# Patient Record
Sex: FEMALE | Race: WHITE | NOT HISPANIC OR LATINO | Employment: PART TIME | URBAN - METROPOLITAN AREA
[De-identification: names, ages, dates, MRNs, and addresses within clinical notes are randomized per-mention and may not be internally consistent; named-entity substitution may affect disease eponyms.]

---

## 2017-01-16 ENCOUNTER — ALLSCRIPTS OFFICE VISIT (OUTPATIENT)
Dept: OTHER | Facility: OTHER | Age: 22
End: 2017-01-16

## 2017-01-16 DIAGNOSIS — Z79.899 OTHER LONG TERM (CURRENT) DRUG THERAPY: ICD-10-CM

## 2017-01-16 DIAGNOSIS — F32.9 MAJOR DEPRESSIVE DISORDER, SINGLE EPISODE: ICD-10-CM

## 2017-01-22 ENCOUNTER — LAB CONVERSION - ENCOUNTER (OUTPATIENT)
Dept: OTHER | Facility: OTHER | Age: 22
End: 2017-01-22

## 2017-01-22 LAB
25(OH)D3 SERPL-MCNC: 32 NG/ML (ref 30–100)
A/G RATIO (HISTORICAL): 1.6 (CALC) (ref 1–2.5)
ALBUMIN SERPL BCP-MCNC: 4.2 G/DL (ref 3.6–5.1)
ALP SERPL-CCNC: 73 U/L (ref 47–176)
ALT SERPL W P-5'-P-CCNC: 13 U/L (ref 5–32)
AST SERPL W P-5'-P-CCNC: 13 U/L (ref 12–32)
BASOPHILS # BLD AUTO: 0.3 %
BASOPHILS # BLD AUTO: 21 CELLS/UL (ref 0–200)
BILIRUB SERPL-MCNC: 0.4 MG/DL (ref 0.2–1.1)
BUN SERPL-MCNC: 14 MG/DL (ref 7–20)
BUN/CREA RATIO (HISTORICAL): NORMAL (CALC) (ref 6–22)
CALCIUM SERPL-MCNC: 9.3 MG/DL (ref 8.9–10.4)
CHLORIDE SERPL-SCNC: 101 MMOL/L (ref 98–110)
CO2 SERPL-SCNC: 30 MMOL/L (ref 20–31)
CREAT SERPL-MCNC: 0.73 MG/DL (ref 0.5–1)
DEPRECATED RDW RBC AUTO: 13.2 % (ref 11–15)
EGFR AFRICAN AMERICAN (HISTORICAL): 138 ML/MIN/1.73M2
EGFR-AMERICAN CALC (HISTORICAL): 119 ML/MIN/1.73M2
EOSINOPHIL # BLD AUTO: 1.4 %
EOSINOPHIL # BLD AUTO: 97 CELLS/UL (ref 15–500)
GAMMA GLOBULIN (HISTORICAL): 2.6 G/DL (CALC) (ref 2–3.8)
GLUCOSE (HISTORICAL): 88 MG/DL (ref 65–99)
HCT VFR BLD AUTO: 39.1 % (ref 35–45)
HGB BLD-MCNC: 12.7 G/DL (ref 11.7–15.5)
LYMPHOCYTES # BLD AUTO: 2118 CELLS/UL (ref 850–3900)
LYMPHOCYTES # BLD AUTO: 30.7 %
MCH RBC QN AUTO: 28.9 PG (ref 27–33)
MCHC RBC AUTO-ENTMCNC: 32.5 G/DL (ref 32–36)
MCV RBC AUTO: 88.7 FL (ref 80–100)
MONOCYTES # BLD AUTO: 497 CELLS/UL (ref 200–950)
MONOCYTES (HISTORICAL): 7.2 %
NEUTROPHILS # BLD AUTO: 4168 CELLS/UL (ref 1500–7800)
NEUTROPHILS # BLD AUTO: 60.4 %
PLATELET # BLD AUTO: 269 THOUSAND/UL (ref 140–400)
PMV BLD AUTO: 9.3 FL (ref 7.5–11.5)
POTASSIUM SERPL-SCNC: 4.5 MMOL/L (ref 3.8–5.1)
RBC # BLD AUTO: 4.41 MILLION/UL (ref 3.8–5.1)
SODIUM SERPL-SCNC: 140 MMOL/L (ref 135–146)
TOTAL PROTEIN (HISTORICAL): 6.8 G/DL (ref 6.3–8.2)
TSH SERPL DL<=0.05 MIU/L-ACNC: 2.09 MIU/L
WBC # BLD AUTO: 6.9 THOUSAND/UL (ref 3.8–10.8)

## 2017-03-30 ENCOUNTER — GENERIC CONVERSION - ENCOUNTER (OUTPATIENT)
Dept: OTHER | Facility: OTHER | Age: 22
End: 2017-03-30

## 2017-03-31 ENCOUNTER — ALLSCRIPTS OFFICE VISIT (OUTPATIENT)
Dept: OTHER | Facility: OTHER | Age: 22
End: 2017-03-31

## 2017-03-31 DIAGNOSIS — G47.9 SLEEP DISORDER: ICD-10-CM

## 2017-03-31 DIAGNOSIS — R53.83 OTHER FATIGUE: ICD-10-CM

## 2017-03-31 DIAGNOSIS — D64.9 ANEMIA: ICD-10-CM

## 2017-04-03 ENCOUNTER — GENERIC CONVERSION - ENCOUNTER (OUTPATIENT)
Dept: OTHER | Facility: OTHER | Age: 22
End: 2017-04-03

## 2017-04-08 ENCOUNTER — LAB CONVERSION - ENCOUNTER (OUTPATIENT)
Dept: OTHER | Facility: OTHER | Age: 22
End: 2017-04-08

## 2017-04-08 LAB
ERYTHROCYTE SEDIMENTATION RATE (HISTORICAL): 9 MM/H
HEPATITIS C ANTIBODY (HISTORICAL): NORMAL
SIGNAL TO CUT-OFF (HISTORICAL): 0.01
T4 FREE SERPL-MCNC: 2.5 NG/DL (ref 0.8–1.4)
TSH SERPL DL<=0.05 MIU/L-ACNC: 0.02 MIU/L

## 2017-04-10 ENCOUNTER — TRANSCRIBE ORDERS (OUTPATIENT)
Dept: ADMINISTRATIVE | Facility: HOSPITAL | Age: 22
End: 2017-04-10

## 2017-04-10 ENCOUNTER — LAB CONVERSION - ENCOUNTER (OUTPATIENT)
Dept: OTHER | Facility: OTHER | Age: 22
End: 2017-04-10

## 2017-04-10 DIAGNOSIS — G47.9 SLEEP DISORDER: Primary | ICD-10-CM

## 2017-04-10 LAB
CORTIS SERPL-MCNC: 10 MCG/DL
ERYTHROCYTE SEDIMENTATION RATE (HISTORICAL): 9 MM/H
FERRITIN SERPL-MCNC: 18 NG/ML (ref 10–154)
HEPATITIS C ANTIBODY (HISTORICAL): NORMAL
LYME IGG/IGM AB (HISTORICAL): <0.9 INDEX
SIGNAL TO CUT-OFF (HISTORICAL): 0.01
T4 FREE SERPL-MCNC: 2.5 NG/DL (ref 0.8–1.4)
TSH SERPL DL<=0.05 MIU/L-ACNC: 0.02 MIU/L

## 2017-04-11 ENCOUNTER — LAB CONVERSION - ENCOUNTER (OUTPATIENT)
Dept: OTHER | Facility: OTHER | Age: 22
End: 2017-04-11

## 2017-04-11 LAB
A/G RATIO (HISTORICAL): 1.5 (CALC) (ref 1–2.5)
ALBUMIN SERPL BCP-MCNC: 4.1 G/DL (ref 3.6–5.1)
ALP SERPL-CCNC: 82 U/L (ref 33–115)
ALT SERPL W P-5'-P-CCNC: 29 U/L (ref 6–29)
ANTI-NUCLEAR ANTIBODY (ANA) (HISTORICAL): NEGATIVE
AST SERPL W P-5'-P-CCNC: 22 U/L (ref 10–30)
BACTERIA UR QL AUTO: ABNORMAL /HPF
BASOPHILS # BLD AUTO: 0.7 %
BASOPHILS # BLD AUTO: 46 CELLS/UL (ref 0–200)
BILIRUB SERPL-MCNC: 0.4 MG/DL (ref 0.2–1.2)
BILIRUB UR QL STRIP: NEGATIVE
BUN SERPL-MCNC: 10 MG/DL (ref 7–25)
BUN/CREA RATIO (HISTORICAL): NORMAL (CALC) (ref 6–22)
CALCIUM OXALATE (HISTORICAL): ABNORMAL /HPF
CALCIUM SERPL-MCNC: 9.3 MG/DL (ref 8.6–10.2)
CHLORIDE SERPL-SCNC: 103 MMOL/L (ref 98–110)
CO2 SERPL-SCNC: 29 MMOL/L (ref 20–31)
COLOR UR: YELLOW
COMMENT (HISTORICAL): CLEAR
CORTIS SERPL-MCNC: 10 MCG/DL
CREAT SERPL-MCNC: 0.61 MG/DL (ref 0.5–1.1)
CULTURE RESULT (HISTORICAL): ABNORMAL
DEPRECATED RDW RBC AUTO: 13.1 % (ref 11–15)
EGFR AFRICAN AMERICAN (HISTORICAL): 151 ML/MIN/1.73M2
EGFR-AMERICAN CALC (HISTORICAL): 131 ML/MIN/1.73M2
EOSINOPHIL # BLD AUTO: 0.9 %
EOSINOPHIL # BLD AUTO: 59 CELLS/UL (ref 15–500)
ERYTHROCYTE SEDIMENTATION RATE (HISTORICAL): 9 MM/H
FECAL OCCULT BLOOD DIAGNOSTIC (HISTORICAL): NEGATIVE
FERRITIN SERPL-MCNC: 18 NG/ML (ref 10–154)
GAMMA GLOBULIN (HISTORICAL): 2.7 G/DL (CALC) (ref 1.9–3.7)
GLUCOSE (HISTORICAL): 86 MG/DL (ref 65–99)
GLUCOSE (HISTORICAL): NEGATIVE
HCT VFR BLD AUTO: 40.2 % (ref 35–45)
HEPATITIS B SURFACE ANTIGEN (HISTORICAL): NORMAL
HEPATITIS C ANTIBODY (HISTORICAL): NORMAL
HGB BLD-MCNC: 13.5 G/DL (ref 11.7–15.5)
HYALINE CASTS #/AREA URNS LPF: ABNORMAL /LPF
KETONES UR STRIP-MCNC: NEGATIVE MG/DL
LEUKOCYTE ESTERASE UR QL STRIP: NEGATIVE
LYME IGG/IGM AB (HISTORICAL): <0.9 INDEX
LYMPHOCYTES # BLD AUTO: 2138 CELLS/UL (ref 850–3900)
LYMPHOCYTES # BLD AUTO: 32.4 %
MCH RBC QN AUTO: 29.3 PG (ref 27–33)
MCHC RBC AUTO-ENTMCNC: 33.5 G/DL (ref 32–36)
MCV RBC AUTO: 87.4 FL (ref 80–100)
MONOCYTES # BLD AUTO: 601 CELLS/UL (ref 200–950)
MONOCYTES (HISTORICAL): 9.1 %
NEUTROPHILS # BLD AUTO: 3755 CELLS/UL (ref 1500–7800)
NEUTROPHILS # BLD AUTO: 56.9 %
NITRITE UR QL STRIP: NEGATIVE
PH UR STRIP.AUTO: 6 [PH] (ref 5–8)
PLATELET # BLD AUTO: 251 THOUSAND/UL (ref 140–400)
PMV BLD AUTO: 9.3 FL (ref 7.5–12.5)
POTASSIUM SERPL-SCNC: 3.9 MMOL/L (ref 3.5–5.3)
PROT UR STRIP-MCNC: NEGATIVE MG/DL
RBC # BLD AUTO: 4.6 MILLION/UL (ref 3.8–5.1)
RBC (HISTORICAL): ABNORMAL /HPF
SIGNAL TO CUT-OFF (HISTORICAL): 0.01
SODIUM SERPL-SCNC: 139 MMOL/L (ref 135–146)
SP GR UR STRIP.AUTO: 1.02 (ref 1–1.03)
SQUAMOUS EPITHELIAL CELLS (HISTORICAL): ABNORMAL /HPF
T4 FREE SERPL-MCNC: 2.5 NG/DL (ref 0.8–1.4)
TOTAL PROTEIN (HISTORICAL): 6.8 G/DL (ref 6.1–8.1)
TSH SERPL DL<=0.05 MIU/L-ACNC: 0.02 MIU/L
WBC # BLD AUTO: 6.6 THOUSAND/UL (ref 3.8–10.8)
WBC # BLD AUTO: ABNORMAL /HPF

## 2017-04-17 ENCOUNTER — TRANSCRIBE ORDERS (OUTPATIENT)
Dept: ADMINISTRATIVE | Facility: HOSPITAL | Age: 22
End: 2017-04-17

## 2017-04-17 DIAGNOSIS — E05.90 HYPERTHYROIDISM: Primary | ICD-10-CM

## 2017-04-26 ENCOUNTER — ALLSCRIPTS OFFICE VISIT (OUTPATIENT)
Dept: OTHER | Facility: OTHER | Age: 22
End: 2017-04-26

## 2017-05-10 ENCOUNTER — HOSPITAL ENCOUNTER (OUTPATIENT)
Dept: NUCLEAR MEDICINE | Facility: HOSPITAL | Age: 22
Discharge: HOME | End: 2017-05-10
Payer: COMMERCIAL

## 2017-05-10 DIAGNOSIS — E05.90 HYPERTHYROIDISM: ICD-10-CM

## 2017-05-11 ENCOUNTER — TRANSCRIBE ORDERS (OUTPATIENT)
Dept: SLEEP CENTER | Facility: CLINIC | Age: 22
End: 2017-05-11

## 2017-05-11 ENCOUNTER — HOSPITAL ENCOUNTER (OUTPATIENT)
Dept: NUCLEAR MEDICINE | Facility: HOSPITAL | Age: 22
Discharge: HOME | End: 2017-05-11
Payer: COMMERCIAL

## 2017-05-11 ENCOUNTER — HOSPITAL ENCOUNTER (OUTPATIENT)
Dept: SLEEP CENTER | Facility: CLINIC | Age: 22
Discharge: HOME | End: 2017-05-11
Payer: COMMERCIAL

## 2017-05-11 DIAGNOSIS — G47.9 SLEEP DISORDER: ICD-10-CM

## 2017-05-11 DIAGNOSIS — G47.33 OSA (OBSTRUCTIVE SLEEP APNEA): Primary | ICD-10-CM

## 2017-05-11 PROCEDURE — A9516 IODINE I-123 SOD IODIDE MIC: HCPCS

## 2017-05-11 PROCEDURE — 78014 THYROID IMAGING W/BLOOD FLOW: CPT

## 2017-05-25 ENCOUNTER — ALLSCRIPTS OFFICE VISIT (OUTPATIENT)
Dept: OTHER | Facility: OTHER | Age: 22
End: 2017-05-25

## 2017-05-25 DIAGNOSIS — E05.90 THYROTOXICOSIS WITHOUT THYROID STORM: ICD-10-CM

## 2017-06-20 ENCOUNTER — TRANSCRIBE ORDERS (OUTPATIENT)
Dept: SLEEP CENTER | Facility: CLINIC | Age: 22
End: 2017-06-20

## 2017-06-20 ENCOUNTER — HOSPITAL ENCOUNTER (OUTPATIENT)
Dept: SLEEP CENTER | Facility: CLINIC | Age: 22
Discharge: HOME | End: 2017-06-20
Payer: COMMERCIAL

## 2017-06-20 DIAGNOSIS — G47.33 OSA (OBSTRUCTIVE SLEEP APNEA): ICD-10-CM

## 2017-06-20 DIAGNOSIS — G47.411 PRIMARY NARCOLEPSY WITH CATAPLEXY: Primary | ICD-10-CM

## 2017-07-11 ENCOUNTER — HOSPITAL ENCOUNTER (EMERGENCY)
Facility: HOSPITAL | Age: 22
Discharge: HOME | End: 2017-07-11
Attending: EMERGENCY MEDICINE | Admitting: EMERGENCY MEDICINE
Payer: COMMERCIAL

## 2017-07-11 VITALS
SYSTOLIC BLOOD PRESSURE: 126 MMHG | DIASTOLIC BLOOD PRESSURE: 104 MMHG | TEMPERATURE: 98.7 F | OXYGEN SATURATION: 100 % | RESPIRATION RATE: 18 BRPM | HEART RATE: 85 BPM

## 2017-07-11 DIAGNOSIS — L29.9 ITCHING: ICD-10-CM

## 2017-07-11 DIAGNOSIS — T63.441A BEE STING REACTION: Primary | ICD-10-CM

## 2017-07-11 PROCEDURE — 99282 EMERGENCY DEPT VISIT SF MDM: CPT

## 2017-07-11 RX ORDER — LORATADINE 10 MG/1
10 TABLET ORAL ONCE
Status: COMPLETED | OUTPATIENT
Start: 2017-07-11 | End: 2017-07-11

## 2017-07-11 RX ORDER — LORATADINE 10 MG/1
10 TABLET ORAL DAILY
Qty: 7 TABLET | Refills: 0 | Status: SHIPPED | OUTPATIENT
Start: 2017-07-11 | End: 2017-08-09

## 2017-07-11 RX ORDER — FAMOTIDINE 20 MG/1
20 TABLET, FILM COATED ORAL ONCE
Status: COMPLETED | OUTPATIENT
Start: 2017-07-11 | End: 2017-07-11

## 2017-07-11 RX ADMIN — LORATADINE 10 MG: 10 TABLET ORAL at 20:16

## 2017-07-11 RX ADMIN — DEXAMETHASONE SODIUM PHOSPHATE 10 MG: 10 INJECTION, SOLUTION INTRAMUSCULAR; INTRAVENOUS at 20:16

## 2017-07-11 RX ADMIN — FAMOTIDINE 20 MG: 20 TABLET ORAL at 20:16

## 2017-07-28 ENCOUNTER — TRANSCRIBE ORDERS (OUTPATIENT)
Dept: URGENT CARE | Age: 22
End: 2017-07-28

## 2017-07-28 ENCOUNTER — OFFICE VISIT (OUTPATIENT)
Dept: URGENT CARE | Age: 22
End: 2017-07-28
Payer: COMMERCIAL

## 2017-07-28 ENCOUNTER — APPOINTMENT (OUTPATIENT)
Dept: LAB | Age: 22
End: 2017-07-28
Attending: PHYSICIAN ASSISTANT
Payer: COMMERCIAL

## 2017-07-28 DIAGNOSIS — R30.0 DYSURIA: ICD-10-CM

## 2017-07-28 DIAGNOSIS — R53.83 OTHER FATIGUE: ICD-10-CM

## 2017-07-28 DIAGNOSIS — R35.0 FREQUENCY OF MICTURITION: ICD-10-CM

## 2017-07-28 PROCEDURE — 87147 CULTURE TYPE IMMUNOLOGIC: CPT

## 2017-07-28 PROCEDURE — 81002 URINALYSIS NONAUTO W/O SCOPE: CPT | Performed by: FAMILY MEDICINE

## 2017-07-28 PROCEDURE — 87086 URINE CULTURE/COLONY COUNT: CPT

## 2017-07-28 PROCEDURE — G0382 LEV 3 HOSP TYPE B ED VISIT: HCPCS | Performed by: FAMILY MEDICINE

## 2017-07-30 LAB
BACTERIA UR CULT: NORMAL
BACTERIA UR CULT: NORMAL

## 2017-08-01 ENCOUNTER — GENERIC CONVERSION - ENCOUNTER (OUTPATIENT)
Dept: OTHER | Facility: OTHER | Age: 22
End: 2017-08-01

## 2017-08-09 ENCOUNTER — HOSPITAL ENCOUNTER (EMERGENCY)
Facility: HOSPITAL | Age: 22
Discharge: HOME | End: 2017-08-09
Attending: EMERGENCY MEDICINE
Payer: COMMERCIAL

## 2017-08-09 ENCOUNTER — APPOINTMENT (EMERGENCY)
Dept: CT IMAGING | Facility: HOSPITAL | Age: 22
End: 2017-08-09
Payer: COMMERCIAL

## 2017-08-09 VITALS
RESPIRATION RATE: 18 BRPM | OXYGEN SATURATION: 100 % | DIASTOLIC BLOOD PRESSURE: 72 MMHG | HEART RATE: 75 BPM | SYSTOLIC BLOOD PRESSURE: 111 MMHG | WEIGHT: 160 LBS | TEMPERATURE: 99 F

## 2017-08-09 DIAGNOSIS — R10.2 PELVIC PAIN: Primary | ICD-10-CM

## 2017-08-09 DIAGNOSIS — N83.209 OVARIAN CYST: ICD-10-CM

## 2017-08-09 LAB
ALBUMIN SERPL BCP-MCNC: 3.8 G/DL (ref 3.5–5)
ALP SERPL-CCNC: 77 U/L (ref 46–116)
ALT SERPL W P-5'-P-CCNC: 33 U/L (ref 12–78)
ANION GAP SERPL CALCULATED.3IONS-SCNC: 8 MMOL/L (ref 4–13)
AST SERPL W P-5'-P-CCNC: 31 U/L (ref 5–45)
BASOPHILS # BLD AUTO: 0.03 THOUSANDS/ΜL (ref 0–0.1)
BASOPHILS NFR BLD AUTO: 0 % (ref 0–1)
BILIRUB SERPL-MCNC: 0.3 MG/DL (ref 0.2–1)
BILIRUB UR QL STRIP: NEGATIVE
BUN SERPL-MCNC: 8 MG/DL (ref 5–25)
CALCIUM SERPL-MCNC: 9.1 MG/DL (ref 8.3–10.1)
CHLORIDE SERPL-SCNC: 103 MMOL/L (ref 100–108)
CLARITY UR: CLEAR
CO2 SERPL-SCNC: 29 MMOL/L (ref 21–32)
COLOR UR: NORMAL
CREAT SERPL-MCNC: 0.64 MG/DL (ref 0.6–1.3)
EOSINOPHIL # BLD AUTO: 0.05 THOUSAND/ΜL (ref 0–0.61)
EOSINOPHIL NFR BLD AUTO: 1 % (ref 0–6)
ERYTHROCYTE [DISTWIDTH] IN BLOOD BY AUTOMATED COUNT: 13.2 % (ref 11.6–15.1)
EXT BILIRUBIN, UA: NORMAL
EXT BLOOD URINE: NORMAL
EXT GLUCOSE, UA: NORMAL
EXT KETONES: NORMAL
EXT NITRITE, UA: NORMAL
EXT PH, UA: 8.5
EXT PROTEIN, UA: NORMAL
EXT SPECIFIC GRAVITY, UA: 1000
EXT UROBILINOGEN: NORMAL
GFR SERPL CREATININE-BSD FRML MDRD: 129 ML/MIN/1.73SQ M
GLUCOSE SERPL-MCNC: 91 MG/DL (ref 65–140)
GLUCOSE UR STRIP-MCNC: NEGATIVE MG/DL
HCG UR QL: NEGATIVE
HCT VFR BLD AUTO: 40.6 % (ref 34.8–46.1)
HGB BLD-MCNC: 13.3 G/DL (ref 11.5–15.4)
HGB UR QL STRIP.AUTO: NEGATIVE
KETONES UR STRIP-MCNC: NEGATIVE MG/DL
LEUKOCYTE ESTERASE UR QL STRIP: NEGATIVE
LIPASE SERPL-CCNC: 138 U/L (ref 73–393)
LYMPHOCYTES # BLD AUTO: 2.54 THOUSANDS/ΜL (ref 0.6–4.47)
LYMPHOCYTES NFR BLD AUTO: 27 % (ref 14–44)
MCH RBC QN AUTO: 29.2 PG (ref 26.8–34.3)
MCHC RBC AUTO-ENTMCNC: 32.8 G/DL (ref 31.4–37.4)
MCV RBC AUTO: 89 FL (ref 82–98)
MONOCYTES # BLD AUTO: 0.59 THOUSAND/ΜL (ref 0.17–1.22)
MONOCYTES NFR BLD AUTO: 6 % (ref 4–12)
NEUTROPHILS # BLD AUTO: 6.36 THOUSANDS/ΜL (ref 1.85–7.62)
NEUTS SEG NFR BLD AUTO: 66 % (ref 43–75)
NITRITE UR QL STRIP: NEGATIVE
PH UR STRIP.AUTO: 8 [PH] (ref 4.5–8)
PLATELET # BLD AUTO: 247 THOUSANDS/UL (ref 149–390)
PMV BLD AUTO: 10.7 FL (ref 8.9–12.7)
POTASSIUM SERPL-SCNC: 3.6 MMOL/L (ref 3.5–5.3)
PROT SERPL-MCNC: 7.4 G/DL (ref 6.4–8.2)
PROT UR STRIP-MCNC: NEGATIVE MG/DL
RBC # BLD AUTO: 4.56 MILLION/UL (ref 3.81–5.12)
SODIUM SERPL-SCNC: 140 MMOL/L (ref 136–145)
SP GR UR STRIP.AUTO: 1.01 (ref 1–1.03)
UROBILINOGEN UR QL STRIP.AUTO: 0.2 E.U./DL
WBC # BLD AUTO: 9.57 THOUSAND/UL (ref 4.31–10.16)
WBC # BLD EST: NORMAL 10*3/UL

## 2017-08-09 PROCEDURE — 99284 EMERGENCY DEPT VISIT MOD MDM: CPT

## 2017-08-09 PROCEDURE — 81003 URINALYSIS AUTO W/O SCOPE: CPT | Performed by: EMERGENCY MEDICINE

## 2017-08-09 PROCEDURE — 85025 COMPLETE CBC W/AUTO DIFF WBC: CPT | Performed by: EMERGENCY MEDICINE

## 2017-08-09 PROCEDURE — 74176 CT ABD & PELVIS W/O CONTRAST: CPT

## 2017-08-09 PROCEDURE — 81025 URINE PREGNANCY TEST: CPT | Performed by: EMERGENCY MEDICINE

## 2017-08-09 PROCEDURE — 81002 URINALYSIS NONAUTO W/O SCOPE: CPT | Performed by: EMERGENCY MEDICINE

## 2017-08-09 PROCEDURE — 83690 ASSAY OF LIPASE: CPT | Performed by: EMERGENCY MEDICINE

## 2017-08-09 PROCEDURE — 80053 COMPREHEN METABOLIC PANEL: CPT | Performed by: EMERGENCY MEDICINE

## 2017-08-09 PROCEDURE — 36415 COLL VENOUS BLD VENIPUNCTURE: CPT | Performed by: EMERGENCY MEDICINE

## 2017-08-09 RX ORDER — NAPROXEN 500 MG/1
500 TABLET ORAL 2 TIMES DAILY WITH MEALS
Qty: 30 TABLET | Refills: 0 | Status: SHIPPED | OUTPATIENT
Start: 2017-08-09 | End: 2018-05-21

## 2017-08-09 RX ORDER — ONDANSETRON 2 MG/ML
4 INJECTION INTRAMUSCULAR; INTRAVENOUS ONCE
Status: DISCONTINUED | OUTPATIENT
Start: 2017-08-09 | End: 2017-08-09 | Stop reason: HOSPADM

## 2017-08-09 RX ORDER — AMOXICILLIN 500 MG/1
500 CAPSULE ORAL EVERY 8 HOURS SCHEDULED
COMMUNITY
End: 2018-05-21

## 2017-08-09 RX ORDER — KETOROLAC TROMETHAMINE 30 MG/ML
15 INJECTION, SOLUTION INTRAMUSCULAR; INTRAVENOUS ONCE
Status: DISCONTINUED | OUTPATIENT
Start: 2017-08-09 | End: 2017-08-09 | Stop reason: HOSPADM

## 2017-08-16 ENCOUNTER — ALLSCRIPTS OFFICE VISIT (OUTPATIENT)
Dept: OTHER | Facility: OTHER | Age: 22
End: 2017-08-16

## 2017-08-17 LAB
BILIRUB UR QL STRIP: NEGATIVE
CLARITY UR: NORMAL
COLOR UR: CLEAR
GLUCOSE (HISTORICAL): NEGATIVE
HGB UR QL STRIP.AUTO: NEGATIVE
KETONES UR STRIP-MCNC: 15 MG/DL
LEUKOCYTE ESTERASE UR QL STRIP: NEGATIVE
NITRITE UR QL STRIP: NEGATIVE
PH UR STRIP.AUTO: 7.5 [PH]
PROT UR STRIP-MCNC: NEGATIVE MG/DL
SP GR UR STRIP.AUTO: 1.01
UROBILINOGEN UR QL STRIP.AUTO: 0.2

## 2017-08-18 LAB
ALT SERPL W P-5'-P-CCNC: 14 U/L (ref 6–29)
AST SERPL W P-5'-P-CCNC: 16 U/L (ref 10–30)
BASOPHILS # BLD AUTO: 0.4 %
BASOPHILS # BLD AUTO: 22 CELLS/UL (ref 0–200)
CULTURE RESULT (HISTORICAL): NORMAL
DEPRECATED RDW RBC AUTO: 12.4 % (ref 11–15)
EOSINOPHIL # BLD AUTO: 0.5 %
EOSINOPHIL # BLD AUTO: 28 CELLS/UL (ref 15–500)
HCT VFR BLD AUTO: 39.8 % (ref 35–45)
HGB BLD-MCNC: 13 G/DL (ref 11.7–15.5)
LYMPHOCYTES # BLD AUTO: 1674 CELLS/UL (ref 850–3900)
LYMPHOCYTES # BLD AUTO: 29.9 %
MAGNESIUM SERPL-MCNC: 1.9 MG/DL (ref 1.5–2.5)
MCH RBC QN AUTO: 29.1 PG (ref 27–33)
MCHC RBC AUTO-ENTMCNC: 32.7 G/DL (ref 32–36)
MCV RBC AUTO: 89 FL (ref 80–100)
MONOCYTES # BLD AUTO: 370 CELLS/UL (ref 200–950)
MONOCYTES (HISTORICAL): 6.6 %
NEUTROPHILS # BLD AUTO: 3506 CELLS/UL (ref 1500–7800)
NEUTROPHILS # BLD AUTO: 62.6 %
PLATELET # BLD AUTO: 241 THOUSAND/UL (ref 140–400)
PMV BLD AUTO: 11.2 FL (ref 7.5–12.5)
POTASSIUM SERPL-SCNC: 4 MMOL/L (ref 3.5–5.3)
RBC # BLD AUTO: 4.47 MILLION/UL (ref 3.8–5.1)
WBC # BLD AUTO: 5.6 THOUSAND/UL (ref 3.8–10.8)

## 2017-08-21 ENCOUNTER — GENERIC CONVERSION - ENCOUNTER (OUTPATIENT)
Dept: OTHER | Facility: OTHER | Age: 22
End: 2017-08-21

## 2017-08-28 ENCOUNTER — ALLSCRIPTS OFFICE VISIT (OUTPATIENT)
Dept: OTHER | Facility: OTHER | Age: 22
End: 2017-08-28

## 2017-08-28 DIAGNOSIS — R39.15 URGENCY OF URINATION: ICD-10-CM

## 2017-08-29 ENCOUNTER — LAB CONVERSION - ENCOUNTER (OUTPATIENT)
Dept: OTHER | Facility: OTHER | Age: 22
End: 2017-08-29

## 2017-08-29 LAB
CONTACT: (HISTORICAL): NORMAL
QUESTION/PROBLEM (HISTORICAL): NORMAL
TEST INFORMATION (HISTORICAL): NORMAL
TEST NAME (HISTORICAL): NORMAL

## 2017-08-31 ENCOUNTER — LAB CONVERSION - ENCOUNTER (OUTPATIENT)
Dept: OTHER | Facility: OTHER | Age: 22
End: 2017-08-31

## 2017-08-31 LAB
CONTACT: (HISTORICAL): NORMAL
CULT RSLT GENITAL (HISTORICAL): NORMAL
TEST INFORMATION (HISTORICAL): NORMAL
TEST NAME (HISTORICAL): NORMAL

## 2017-09-01 ENCOUNTER — LAB CONVERSION - ENCOUNTER (OUTPATIENT)
Dept: OTHER | Facility: OTHER | Age: 22
End: 2017-09-01

## 2017-09-01 LAB
CLINICAL COMMENT (HISTORICAL): NORMAL
CONTACT: (HISTORICAL): NORMAL
CULT RSLT GENITAL (HISTORICAL): NORMAL
HEPATITIS B SURFACE ANTIGEN (HISTORICAL): NORMAL
HEPATITIS C ANTIBODY (HISTORICAL): NORMAL
LYME IGG/IGM AB (HISTORICAL): <0.9 INDEX
RPR SCREEN (HISTORICAL): NORMAL
SIGNAL TO CUT-OFF (HISTORICAL): 0.01
TEST INFORMATION (HISTORICAL): NORMAL
TEST NAME (HISTORICAL): NORMAL
TRICHOMONAS (HISTORICAL): NOT DETECTED

## 2017-09-07 ENCOUNTER — HOSPITAL ENCOUNTER (OUTPATIENT)
Dept: ULTRASOUND IMAGING | Facility: HOSPITAL | Age: 22
Discharge: HOME | End: 2017-09-07
Payer: COMMERCIAL

## 2017-09-07 DIAGNOSIS — R39.15 URGENCY OF URINATION: ICD-10-CM

## 2017-09-07 PROCEDURE — 76856 US EXAM PELVIC COMPLETE: CPT

## 2017-09-07 PROCEDURE — 76830 TRANSVAGINAL US NON-OB: CPT

## 2017-09-12 ENCOUNTER — TRANSCRIBE ORDERS (OUTPATIENT)
Dept: SLEEP CENTER | Facility: CLINIC | Age: 22
End: 2017-09-12

## 2017-09-12 ENCOUNTER — HOSPITAL ENCOUNTER (OUTPATIENT)
Dept: SLEEP CENTER | Facility: CLINIC | Age: 22
Discharge: HOME | End: 2017-09-12
Payer: COMMERCIAL

## 2017-09-12 DIAGNOSIS — G47.411 PRIMARY NARCOLEPSY WITH CATAPLEXY: ICD-10-CM

## 2017-09-12 DIAGNOSIS — G47.33 OSA (OBSTRUCTIVE SLEEP APNEA): Primary | ICD-10-CM

## 2017-09-14 ENCOUNTER — TRANSCRIBE ORDERS (OUTPATIENT)
Dept: SLEEP CENTER | Facility: CLINIC | Age: 22
End: 2017-09-14

## 2017-09-14 DIAGNOSIS — G47.33 OSA (OBSTRUCTIVE SLEEP APNEA): Primary | ICD-10-CM

## 2017-10-30 ENCOUNTER — ALLSCRIPTS OFFICE VISIT (OUTPATIENT)
Dept: OTHER | Facility: OTHER | Age: 22
End: 2017-10-30

## 2017-10-30 DIAGNOSIS — D64.9 ANEMIA: ICD-10-CM

## 2017-10-30 DIAGNOSIS — F41.9 ANXIETY DISORDER: ICD-10-CM

## 2017-10-31 NOTE — PROGRESS NOTES
Assessment  1  Anxiety (300 00) (F41 9)    Plan  Anemia    · (1) CBC/ PLT (NO DIFF); Status:Active; Requested USH:[id number]; Anemia, Anxiety    · DULoxetine HCl - 30 MG Oral Capsule Delayed Release Particles; TAKE 1 CAPSULE Daily   · (1) COMPREHENSIVE METABOLIC PANEL; Status:Active; Requested for:30Oct2017;    · (1) TSH; Status:Active; Requested for:30Oct2017;    · Elena Stovall MD, Carmelo Dugan  (Allergy/Immunology) Co-Management  *  Status: Hold For - Scheduling   Requested for: 30VCM3522  Care Summary provided  : Yes  Anxiety    · ALPRAZolam 0 5 MG Oral Tablet (Xanax); take 1 tablet at bedtime as needed   · BusPIRone HCl - 10 MG Oral Tablet; TAKE 1 TABLET TWICE DAILY  Depression    · BusPIRone HCl - 15 MG Oral Tablet  PMH: Need for prophylactic vaccination and inoculation against influenza    · DULoxetine HCl - 60 MG Oral Capsule Delayed Release Particles (Cymbalta); TAKE 1  CAPSULE EVERY DAY BY MOUTH  Unlinked    · Naprosyn 500 MG Oral Tablet (Naproxen)    Discussion/Summary  Discussion Summary:   Advise to f/u with psychiatrist will give medicine only for 1 month  XANAX  Counseling Documentation With Imm: The patient was counseled regarding risk factor reductions,-- prognosis  Chief Complaint  Chief Complaint Free Text Note Form: pt  presents for follow up for anxiety  pt  did not have BW done  pt  would like to discuss Cymbalta  pt  would like Med refills      History of Present Illness  Anxiety Disorder, NOS (Brief): The patient is being seen for a routine clinic follow-up of anxiety  Symptoms:  anxiety,-- excessive worry-- and-- sleep disruption, but-- no difficulty concentrating,-- no fatigue,-- no nervousness-- and-- no panic attacks  The patient is currently experiencing symptoms  No associated symptoms are reported  Review of Systems  Complete-Female:   Constitutional: No fever, no chills, feels well, no tiredness, no recent weight gain or weight loss     Eyes: No complaints of eye pain, no red eyes, no eyesight problems, no discharge, no dry eyes, no itching of eyes  ENT: no complaints of earache, no loss of hearing, no nose bleeds, no nasal discharge, no sore throat, no hoarseness  Cardiovascular: No complaints of slow heart rate, no fast heart rate, no chest pain, no palpitations, no leg claudication, no lower extremity edema  Respiratory: No complaints of shortness of breath, no wheezing, no cough, no SOB on exertion, no orthopnea, no PND  Gastrointestinal: No complaints of abdominal pain, no constipation, no nausea or vomiting, no diarrhea, no bloody stools  Musculoskeletal: No complaints of arthralgias, no myalgias, no joint swelling or stiffness, no limb pain or swelling  Psychiatric: as noted in HPI  Active Problems  1  Acute hypersomnolence disorder (780 54) (G47 10)   2  Anemia (285 9) (D64 9)   3  Anxiety (300 00) (F41 9)   4  Bee sting allergy (V15 06) (Z91 038)   5  Depression (311) (F32 9)   6  Encounter for long-term (current) use of other medications (V58 69) (Z79 899)   7  Fatigue (780 79) (R53 83)   8  Hyperthyroidism (242 90) (E05 90)   9  Sleep disorder (780 50) (G47 9)    Past Medical History  1  History of Asthma (493 90) (J45 909)   2  History of Behavioral Problems (V40 9)   3  History of Birth History   4  History of Ear pain, right (388 70) (H92 01)   5  History of allergic rhinitis (V12 69) (Z87 09)   6  History of allergy (V15 09) (Z88 9)   7  History of candidal vulvovaginitis (V13 29) (Z86 19)   8  History of depression (V11 8) (Z86 59)   9  History of dysuria (V13 00) (Z87 898)   10  History of pneumonia (V12 61) (Z87 01)   11  History of tendinitis (V13 59) (Z87 39)   12  History of upper respiratory infection (V12 09) (Z87 09)   13  History of urinary frequency (V13 09) (Z87 898)   14  History of urinary urgency (V13 00) (Z87 448)   15  History of Need for Menactra vaccination (V03 89) (Z23)   16  History of Otalgia, unspecified laterality (388 70) (H96 09)   17  History of Plantar wart of right foot (078 12) (B07 0)   18  History of PPD screening test (V74 1) (Z11 1)   19  History of Reactive airway disease (493 90) (J45 909)   20  History of Screening for depression (V79 0) (Z13 89)   21  Urinary tract infection (599 0) (N39 0)  Active Problems And Past Medical History Reviewed: The active problems and past medical history were reviewed and updated today  Surgical History  1  History of Dental Surgery   2  History of Ear Pressure Equalization Tube, Insertion, Bilaterally  Surgical History Reviewed: The surgical history was reviewed and updated today  Family History  Mother    1  Family history of Colon cancer   2  Family history of Ovarian cancer  Father    3  Unknown family medical history  Family History    4  Family history of allergies (V19 6) (Z84 89)   5  Family history of diabetes mellitus (V18 0) (Z83 3)   6  Family history of malignant neoplasm of uterus (V16 49) (Z80 49)  Family History Reviewed: The family history was reviewed and updated today  Social History   · Currently sexually active   · Native language   · Never a smoker   · No drug use   · Racial background  Social History Reviewed: The social history was reviewed and updated today  Current Meds   1  ALPRAZolam 0 5 MG Oral Tablet; take 1 tablet at bedtime as needed; Last Rx:22Uww8403 Ordered   2  BusPIRone HCl - 10 MG Oral Tablet; TAKE 1 TABLET TWICE DAILY; Therapy: (Recorded:29Jab8220) to Recorded   3  BusPIRone HCl - 15 MG Oral Tablet; TAKE 1 TABLET TWICE DAILY  Requested for: 52SGA6179; Last   Rx:47Ftq7404 Ordered   4  DULoxetine HCl - 60 MG Oral Capsule Delayed Release Particles; TAKE 1 CAPSULE EVERY DAY BY   MOUTH  Requested for: 65ETG5323; Last Rx:91Uvf4912 Ordered   5  EpiPen 2-Jeramy 0 3 MG/0 3ML Injection Solution Auto-injector; INJECT 0 3ML INTRAMUSCULARLY AS   DIRECTED; Therapy: 12NWP6964 to (Last Rx:13Xad7393)  Requested for: 16Zkf2482 Ordered   6   Multivitamins TABS; TAKE 1 TABLET DAILY; Therapy: (Recorded:28Skn7474) to Recorded   7  Naprosyn 500 MG Oral Tablet; TAKE 1 TABLET EVERY 12 HOURS AS NEEDED; Therapy: (Recorded:28Mrd5424) to Recorded   8  Xyrem 500 MG/ML Oral Solution; 3 25 grams at bedtime and 4 hours after first dose; Therapy: (Recorded:30Oct2017) to Recorded  Medication List Reviewed: The medication list was reviewed and updated today  Allergies  1  Naproxen Sodium TABS  2  Bee sting    Vitals  Vital Signs    Recorded: 39LAT7182 02:57PM   Temperature 97 7 F, Tympanic   Heart Rate 78   Systolic 939, LUE, Sitting   Diastolic 74, LUE, Sitting   Height 5 ft 2 in   Weight 147 lb 6 oz   BMI Calculated 26 96   BSA Calculated 1 68   O2 Saturation 98, RA     Physical Exam    Constitutional   General appearance: No acute distress, well appearing and well nourished  Eyes   Conjunctiva and lids: No swelling, erythema or discharge  Ears, Nose, Mouth, and Throat   Oropharynx: Normal with no erythema, edema, exudate or lesions  Pulmonary   Auscultation of lungs: Clear to auscultation  Cardiovascular   Examination of extremities for edema and/or varicosities: Normal     Carotid pulses: Normal     Abdomen   Abdomen: Non-tender, no masses  Liver and spleen: No hepatomegaly or splenomegaly  Musculoskeletal   Gait and station: Normal     Neurologic   Cranial nerves: Cranial nerves 2-12 intact  Psychiatric   Orientation to person, place, and time: Normal     Mood and affect: Abnormal   Mood and Affect: anxious  Health Management  Depression   PHevery-9 Adult Depression Screening; every 3 months; Last 73DNC5923; Next Due: 88OZC6950; Overdue  Depression screening   *VB-Depression Screening; every 1 year; Last 79TSJ1962; Next Due: 48AEM9854; Active    Signatures   Electronically signed by :  Tessa Ansari MD; Oct 30 2017  3:21PM EST                       (Author)

## 2017-11-14 ENCOUNTER — LAB CONVERSION - ENCOUNTER (OUTPATIENT)
Dept: OTHER | Facility: OTHER | Age: 22
End: 2017-11-14

## 2017-11-14 LAB
A/G RATIO (HISTORICAL): 1.7 (CALC) (ref 1–2.5)
ALBUMIN SERPL BCP-MCNC: 4.5 G/DL (ref 3.6–5.1)
ALP SERPL-CCNC: 63 U/L (ref 33–115)
ALT SERPL W P-5'-P-CCNC: 12 U/L (ref 6–29)
AST SERPL W P-5'-P-CCNC: 13 U/L (ref 10–30)
BILIRUB SERPL-MCNC: 0.5 MG/DL (ref 0.2–1.2)
BUN SERPL-MCNC: 11 MG/DL (ref 7–25)
BUN/CREA RATIO (HISTORICAL): NORMAL (CALC) (ref 6–22)
CALCIUM SERPL-MCNC: 10 MG/DL (ref 8.6–10.2)
CHLORIDE SERPL-SCNC: 102 MMOL/L (ref 98–110)
CO2 SERPL-SCNC: 31 MMOL/L (ref 20–31)
CREAT SERPL-MCNC: 0.74 MG/DL (ref 0.5–1.1)
EGFR AFRICAN AMERICAN (HISTORICAL): 135 ML/MIN/1.73M2
EGFR-AMERICAN CALC (HISTORICAL): 117 ML/MIN/1.73M2
GAMMA GLOBULIN (HISTORICAL): 2.6 G/DL (CALC) (ref 1.9–3.7)
GLUCOSE (HISTORICAL): 83 MG/DL (ref 65–99)
POTASSIUM SERPL-SCNC: 4 MMOL/L (ref 3.5–5.3)
SODIUM SERPL-SCNC: 140 MMOL/L (ref 135–146)
TOTAL PROTEIN (HISTORICAL): 7.1 G/DL (ref 6.1–8.1)
TSH SERPL DL<=0.05 MIU/L-ACNC: 1.33 MIU/L

## 2017-11-26 ENCOUNTER — LAB CONVERSION - ENCOUNTER (OUTPATIENT)
Dept: OTHER | Facility: OTHER | Age: 22
End: 2017-11-26

## 2017-11-26 LAB — TSH SERPL DL<=0.05 MIU/L-ACNC: 1.89 MIU/L

## 2017-11-27 ENCOUNTER — LAB CONVERSION - ENCOUNTER (OUTPATIENT)
Dept: OTHER | Facility: OTHER | Age: 22
End: 2017-11-27

## 2017-11-27 LAB — TSH SERPL DL<=0.05 MIU/L-ACNC: 1.89 MIU/L

## 2017-11-28 ENCOUNTER — LAB CONVERSION - ENCOUNTER (OUTPATIENT)
Dept: OTHER | Facility: OTHER | Age: 22
End: 2017-11-28

## 2017-11-28 LAB
T3FREE SERPL-MCNC: 3.2 PG/ML (ref 3–4.7)
T4 FREE SERPL-MCNC: 1.1 NG/DL (ref 0.8–1.4)
TSH SERPL DL<=0.05 MIU/L-ACNC: 1.89 MIU/L
TSI (HISTORICAL): 169 % BASELINE
VIT B12 SERPL-MCNC: 1069 PG/ML (ref 200–1100)

## 2017-12-05 ENCOUNTER — TRANSCRIBE ORDERS (OUTPATIENT)
Dept: SLEEP CENTER | Facility: CLINIC | Age: 22
End: 2017-12-05

## 2018-01-06 ENCOUNTER — OFFICE VISIT (OUTPATIENT)
Dept: URGENT CARE | Age: 23
End: 2018-01-06
Payer: COMMERCIAL

## 2018-01-06 PROCEDURE — G0382 LEV 3 HOSP TYPE B ED VISIT: HCPCS | Performed by: FAMILY MEDICINE

## 2018-01-11 NOTE — RESULT NOTES
Discussion/Summary   will send in amoxicillin- pt states understanding  Verified Results  (1) URINE CULTURE 16Hqf6249 06:56AM Eddie Bailey Order Number: [id number]     Test Name Result Flag Reference   CLINICAL REPORT (Report)     Test:        Urine culture  Specimen Source:  Urine, Clean Catch  Specimen Type:   Urine  Specimen Date:   7/29/2017 6:56 AM  Result Date:    7/30/2017 7:32 AM  Result Status:   Final result  Resulting Lab:   [place] [address]            Tel: [phone]      CULTURE                                       ------------------                                   >100,000 cfu/ml Beta Hemolytic Streptococcus Group B     *** This organism is intrinisically susceptible to Penicillin  If sensitivites to other antibiotics are required, please call the      Microbiology Department at [phone] within 5 days   ***    <10,000 cfu/ml Mixed Contaminants X2       Plan  Urinary tract infection    · Amoxicillin 500 MG Oral Capsule; TAKE 1 CAPSULE 3 TIMES DAILY UNTIL GONE

## 2018-01-12 VITALS
TEMPERATURE: 97.7 F | BODY MASS INDEX: 27.12 KG/M2 | HEART RATE: 78 BPM | OXYGEN SATURATION: 98 % | SYSTOLIC BLOOD PRESSURE: 100 MMHG | DIASTOLIC BLOOD PRESSURE: 74 MMHG | WEIGHT: 147.38 LBS | HEIGHT: 62 IN

## 2018-01-12 VITALS
WEIGHT: 160 LBS | SYSTOLIC BLOOD PRESSURE: 118 MMHG | HEIGHT: 61 IN | DIASTOLIC BLOOD PRESSURE: 78 MMHG | RESPIRATION RATE: 16 BRPM | OXYGEN SATURATION: 98 % | TEMPERATURE: 99.1 F | BODY MASS INDEX: 30.21 KG/M2 | HEART RATE: 60 BPM

## 2018-01-12 VITALS
SYSTOLIC BLOOD PRESSURE: 112 MMHG | DIASTOLIC BLOOD PRESSURE: 72 MMHG | WEIGHT: 156 LBS | BODY MASS INDEX: 28.71 KG/M2 | HEIGHT: 62 IN

## 2018-01-13 VITALS
BODY MASS INDEX: 29.84 KG/M2 | DIASTOLIC BLOOD PRESSURE: 78 MMHG | OXYGEN SATURATION: 96 % | WEIGHT: 168.38 LBS | SYSTOLIC BLOOD PRESSURE: 116 MMHG | HEART RATE: 90 BPM | HEIGHT: 63 IN | TEMPERATURE: 98 F

## 2018-01-13 VITALS
BODY MASS INDEX: 29.23 KG/M2 | SYSTOLIC BLOOD PRESSURE: 104 MMHG | HEART RATE: 98 BPM | DIASTOLIC BLOOD PRESSURE: 68 MMHG | OXYGEN SATURATION: 96 % | HEIGHT: 63 IN | WEIGHT: 165 LBS | TEMPERATURE: 98.9 F

## 2018-01-14 VITALS
TEMPERATURE: 98.4 F | SYSTOLIC BLOOD PRESSURE: 118 MMHG | WEIGHT: 163.13 LBS | BODY MASS INDEX: 30.8 KG/M2 | HEIGHT: 61 IN | HEART RATE: 83 BPM | DIASTOLIC BLOOD PRESSURE: 80 MMHG | OXYGEN SATURATION: 98 %

## 2018-01-14 NOTE — RESULT NOTES
Verified Results  (1) CBC/PLT/DIFF 90ABM8146 10:50AM Celia Montgomery   REPORT COMMENT:  FASTING:NO     Test Name Result Flag Reference   WHITE BLOOD CELL COUNT 5 6 Thousand/uL  3 8-10 8   RED BLOOD CELL COUNT 4 47 Million/uL  3 80-5 10   HEMOGLOBIN 13 0 g/dL  11 7-15 5   HEMATOCRIT 39 8 %  35 0-45 0   MCV 89 0 fL  80 0-100 0   MCH 29 1 pg  27 0-33 0   MCHC 32 7 g/dL  32 0-36 0   RDW 12 4 %  11 0-15 0   PLATELET COUNT 950 Thousand/uL  140-400   ABSOLUTE NEUTROPHILS 3506 cells/uL  1627-2584   ABSOLUTE LYMPHOCYTES 1674 cells/uL  850-3900   ABSOLUTE MONOCYTES 370 cells/uL  200-950   ABSOLUTE EOSINOPHILS 28 cells/uL     ABSOLUTE BASOPHILS 22 cells/uL  0-200   NEUTROPHILS 62 6 %     LYMPHOCYTES 29 9 %     MONOCYTES 6 6 %     EOSINOPHILS 0 5 %     BASOPHILS 0 4 %     MPV 11 2 fL  7 5-12 5     (1) POTASSIUM 70Ngf3698 10:50AM Celia Montgomery     Test Name Result Flag Reference   POTASSIUM 4 0 mmol/L  3 5-5 3     (1) MAGNESIUM 96PBW5222 10:50AM Celia Montgomery     Test Name Result Flag Reference   MAGNESIUM 1 9 mg/dL  1 5-2 5     (1) ALT (SGPT) 03QAQ8550 10:50AM [place] [facility]     Test Name Result Flag Reference   ALT 14 U/L  6-29     (1) AST (SGOT) 46KGW8048 10:50AM Celia Valentina     Test Name Result Flag Reference   AST 16 U/L  10-30     (1) URINE CULTURE 51ZYI6906 12:00AM [place] [facility]     Test Name Result Flag Reference   CULTURE, URINE, ROUTINE      CULTURE, URINE, ROUTINE         MICRO NUMBER:      [id number]    TEST STATUS:       FINAL    SPECIMEN SOURCE:   URINE    SPECIMEN QUALITY:  ADEQUATE    RESULT:            No Growth

## 2018-01-15 ENCOUNTER — ALLSCRIPTS OFFICE VISIT (OUTPATIENT)
Dept: OTHER | Facility: OTHER | Age: 23
End: 2018-01-15

## 2018-01-15 DIAGNOSIS — M25.511 PAIN IN RIGHT SHOULDER: ICD-10-CM

## 2018-01-15 NOTE — MISCELLANEOUS
Message  Pt, called and stated that she was bitten on both hands by feral cats  Instructed pt, to call [facility] and see if they have rabies vaccine for her  If they did not was instructed to go to ER      Active Problems    1  Acute hypersomnolence disorder (780 54) (G47 10)   2  Anemia (285 9) (D64 9)   3  Anxiety (300 00) (F41 9)   4  Depression (311) (F32 9)   5  Fatigue (780 79) (R53 83)   6  Screening for depression (V79 0) (Z13 89)   7  Sleep disorder (780 50) (G47 9)    Current Meds   1  ALPRAZolam 0 5 MG Oral Tablet; take 1 tablet at bedtime as needed; Last   Rx:00Uwi1386 Ordered   2  Cymbalta 20 MG Oral Capsule Delayed Release Particles; Therapy: (Recorded:77Mtf5413) to Recorded   3  Multivitamins TABS; Therapy: (Recorded:03Mar2016) to Recorded    Allergies    1  No Known Drug Allergies    2  No Known Environmental Allergies   3  No Known Food Allergies    Signatures   Electronically signed by : Abdulaziz Fuentes;  Aug 26 2016  2:27PM EST                       (Acknowledgement)

## 2018-01-22 VITALS
RESPIRATION RATE: 16 BRPM | DIASTOLIC BLOOD PRESSURE: 70 MMHG | WEIGHT: 132.2 LBS | HEIGHT: 62 IN | OXYGEN SATURATION: 98 % | HEART RATE: 72 BPM | SYSTOLIC BLOOD PRESSURE: 110 MMHG | BODY MASS INDEX: 24.33 KG/M2 | TEMPERATURE: 99.4 F

## 2018-01-23 VITALS
HEART RATE: 76 BPM | RESPIRATION RATE: 18 BRPM | BODY MASS INDEX: 24.48 KG/M2 | HEIGHT: 62 IN | WEIGHT: 133 LBS | DIASTOLIC BLOOD PRESSURE: 70 MMHG | SYSTOLIC BLOOD PRESSURE: 120 MMHG | OXYGEN SATURATION: 99 % | TEMPERATURE: 98.5 F

## 2018-01-23 NOTE — PROGRESS NOTES
Assessment    1  Acute viral bronchitis (466 0) (J20 8)    Plan  Acute viral bronchitis    · Benzonatate 100 MG Oral Capsule (Tessalon Perles); Take 1 capsule twice daily   · Promethazine-DM 6 25-15 MG/5ML Oral Syrup; TAKE 5 ML EVERY 4 TO 6 HOURS  AS NEEDED    Discussion/Summary  Discussion Summary:   Acute bronchitis  Patient is advised to use again DM for cough on also Tessalon Perles  To buy OTC medication for constipation and use as directed  Medication Side Effects Reviewed: Possible side effects of new medications were reviewed with the patient/guardian today  Understands and agrees with treatment plan: The treatment plan was reviewed with the patient/guardian  The patient/guardian understands and agrees with the treatment plan   Counseling Documentation With Imm: The patient was counseled regarding instructions for management, risk factor reductions, risks and benefits of treatment options, importance of compliance with treatment  Follow Up Instructions: Follow Up with your Primary Care Provider in 7 days  If your symptoms worsen, go to the nearest [place] [address] Emergency Department  Chief Complaint    1  Cold Symptoms  Chief Complaint Free Text Note Form: pt reports cold S/S over 2 weeks with nasal congestion of yellow discharge and cough      History of Present Illness  HPI: As above  24 y/o female patient will has been having complaints of cough and cold x2 weeks  Intermittent nausea  Previous history of constipation and has been constipated in a bit in the recent past  Reports 2 episodes of diarrhea x2 weeks  No fever or chills  No abdominal pain  No rashes  Denies sore throat  However reports runny nose with mild mucus congestion  Denies wheezing or shortness of breath  No history of asthma or bronchitis   Hospital Based Practices Required Assessment:   Pain Assessment   the patient states they have pain  The pain is located in the cough   The patient describes the pain as aching  (on a scale of 0 to 10, the patient rates the pain at 4 )   Abuse And Domestic Violence Screen    Yes, the patient is safe at home  The patient states no one is hurting them  Depression And Suicide Screen  No, the patient has not had thoughts of hurting themself  No, the patient has not felt depressed in the past 7 days  Prefered Language is  [place]  Primary Language is  English  Cold Symptoms: Sebastian Lehman presents with complaints of cold symptoms  Associated symptoms include nasal congestion, runny nose, productive cough and fatigue, but no sneezing, no scratchy throat, no sore throat, no facial pressure, no facial pain, no ear pain, no wheezing, no weakness, no nausea, no vomiting and no fever  Review of Systems  ROS Reviewed:   ROS reviewed  Active Problems    1  Acute hypersomnolence disorder (780 54) (G47 10)   2  Anemia (285 9) (D64 9)   3  Anxiety (300 00) (F41 9)   4  Bee sting allergy (V15 06) (Z91 038)   5  Depression (311) (F32 9)   6  Fatigue (780 79) (R53 83)   7  Hyperthyroidism (242 90) (E05 90)   8  Sleep disorder (780 50) (G47 9)    Past Medical History    1  History of Asthma (493 90) (J45 909)   2  History of Behavioral Problems (V40 9)   3  History of Birth History   4  History of Ear pain, right (388 70) (H92 01)   5  History of allergic rhinitis (V12 69) (Z87 09)   6  History of allergy (V15 09) (Z88 9)   7  History of candidal vulvovaginitis (V13 29) (Z86 19)   8  History of depression (V11 8) (Z86 59)   9  History of dysuria (V13 00) (Z87 898)   10  History of pneumonia (V12 61) (Z87 01)   11  History of tendinitis (V13 59) (Z87 39)   12  History of upper respiratory infection (V12 09) (Z87 09)   13  History of urinary frequency (V13 09) (Z87 898)   14  History of urinary urgency (V13 00) (Z87 448)   15  History of Need for Menactra vaccination (V03 89) (Z23)   16  History of Otalgia, unspecified laterality (388 70) (H92 09)   17   History of Plantar wart of right foot (078 12) (B07 0)   18  History of PPD screening test (V74 1) (Z11 1)   19  History of Reactive airway disease (493 90) (J45 909)   20  History of Screening for depression (V79 0) (Z13 89)   21  Urinary tract infection (599 0) (N39 0)  Active Problems And Past Medical History Reviewed: The active problems and past medical history were reviewed and updated today  Family History  Mother    1  Family history of Colon cancer   2  Family history of Ovarian cancer  Father    3  Unknown family medical history  Family History    4  Family history of allergies (V19 6) (Z84 89)   5  Family history of diabetes mellitus (V18 0) (Z83 3)   6  Family history of malignant neoplasm of uterus (V16 49) (Z80 49)  Family History Reviewed: The family history was reviewed and updated today  Social History    · Currently sexually active   · Native language   · Never a smoker   · No drug use   · Occasional alcohol use   · Racial background  Social History Reviewed: The social history was reviewed and updated today  The social history was reviewed and is unchanged  Surgical History    1  History of Dental Surgery   2  History of Ear Pressure Equalization Tube, Insertion, Bilaterally  Surgical History Reviewed: The surgical history was reviewed and updated today  Current Meds   1  ALPRAZolam 0 5 MG Oral Tablet; take 1 tablet at bedtime as needed; Last Rx:30Oct2017   Ordered   2  BusPIRone HCl - 10 MG Oral Tablet; TAKE 1 TABLET TWICE DAILY  Requested for:   59Fjj2816; Last Rx:30Oct2017; Status: ACTIVE - Renewal Denied Ordered   3  DULoxetine HCl - 30 MG Oral Capsule Delayed Release Particles; take 1 capsule daily; Therapy: 04ICD8103 to (Pamela Ramos)  Requested for: 58KCJ7851; Last   Rx:30Oct2017 Ordered   4  DULoxetine HCl - 60 MG Oral Capsule Delayed Release Particles; TAKE 1 CAPSULE   EVERY DAY BY MOUTH  Requested for: 83Ikj8718; Last Rx:30Oct2017; Status: ACTIVE   - Renewal Denied Ordered   5   EpiPen 2-Jeramy 0 3 MG/0 3ML Injection Solution Auto-injector; INJECT 0 3ML   INTRAMUSCULARLY AS DIRECTED; Therapy: 76IKJ8163 to (Last Rx:06Kaa2227)  Requested for: 99Yya9840 Ordered   6  Multivitamins TABS; TAKE 1 TABLET DAILY; Therapy: (Recorded:30Oct2017) to Recorded   7  Xyrem 500 MG/ML Oral Solution; 3 25 grams at bedtime and 4 hours after first dose; Therapy: (Recorded:30Oct2017) to Recorded  Medication List Reviewed: The medication list was reviewed and updated today  Allergies    1  Naproxen Sodium TABS    2  Bee sting    Vitals  Signs   Recorded: 99XCK4855 01:20PM   Temperature: 98 5 F, Tympanic  Heart Rate: 76, L Radial  Pulse Quality: Regular, L Radial  Respiration Quality: Normal  Respiration: 18  Systolic: 408, LUE, Sitting  Diastolic: 70, LUE, Sitting  Height: 5 ft 2 in  Weight: 133 lb   BMI Calculated: 24 33  BSA Calculated: 1 61  O2 Saturation: 99, RA  LMP: 69GVI0019  Pain Scale: 4/10    Physical Exam    Ears, Nose, Mouth, and Throat   External inspection of ears and nose: Normal     Otoscopic examination: Tympanic membranes translucent with normal light reflex  Canals patent without erythema  Nasal mucosa, septum, and turbinates: Abnormal   1 to 2+ turbinates, mild discharge in the nares  No nosebleed  Oropharynx: Normal with no erythema, edema, exudate or lesions  Pulmonary   Respiratory effort: No increased work of breathing or signs of respiratory distress  Auscultation of lungs: Clear to auscultation  Cardiovascular   Auscultation of heart: Normal rate and rhythm, normal S1 and S2, without murmurs  Abdomen   Abdomen: Abnormal   Hyperactive bowel sounds on the right side and low bowel sounds on the left side  Right side of the abdomen soft  The left side of the abdomen slightly hard     Psychiatric   Orientation to person, place, and time: Normal     Mood and affect: Normal        Signatures   Electronically signed by : Mayo San; Jan 6 2018  1:54PM EST (Author)

## 2018-01-29 ENCOUNTER — EVALUATION (OUTPATIENT)
Dept: PHYSICAL THERAPY | Facility: REHABILITATION | Age: 23
End: 2018-01-29
Payer: COMMERCIAL

## 2018-01-29 DIAGNOSIS — M25.511 CHRONIC RIGHT SHOULDER PAIN: Primary | ICD-10-CM

## 2018-01-29 DIAGNOSIS — G89.29 CHRONIC RIGHT SHOULDER PAIN: Primary | ICD-10-CM

## 2018-01-29 DIAGNOSIS — M25.511 PAIN IN RIGHT SHOULDER: ICD-10-CM

## 2018-01-29 PROCEDURE — G8984 CARRY CURRENT STATUS: HCPCS | Performed by: PHYSICAL THERAPIST

## 2018-01-29 PROCEDURE — G8985 CARRY GOAL STATUS: HCPCS | Performed by: PHYSICAL THERAPIST

## 2018-01-29 PROCEDURE — 97162 PT EVAL MOD COMPLEX 30 MIN: CPT | Performed by: PHYSICAL THERAPIST

## 2018-01-29 NOTE — PROGRESS NOTES
PT Evaluation     Today's date: 2018  Patient name: Jm Leahy  : 1995  MRN: [id number]  Referring provider: ALVARO Stanley  Dx:   Encounter Diagnosis   Name Primary?  Chronic right shoulder pain Yes                  Assessment  Impairments: abnormal or restricted ROM, activity intolerance, impaired physical strength and pain with function  Functional limitations: Patient reports to evaluation with Chronic right shoulder pain  (primary encounter diagnosis)Pain in right shoulder with the following functional impairments: decreased ability of reaching, lifting, donning and doffing clothing, and work duties  Patient is not irritable  Assessment details: Jm Leahy is a 23y o  year old female who presents to [facility] with:   Chronic right shoulder pain  (primary encounter diagnosis)  Pain in right shoulder    [place] presents with the impairments as listed above and would benefit from Physical Therapy to address these impairments and to maximize function  Understanding of Dx/Px/POC: good   Prognosis: good  Prognosis details: Patient's prognosis may be affected by the following: anxiety, depression, bladder retention, narcolepsy, back pain    Goals  Short-term goals:   1  Patient's pain will be decreased to 3/10 within 4 weeks  2  Patient's strength will increase to 4-/5 within 4 weeks     Long-term goals:   1  Patient will be able to perform lift bilateral UE with minimal to no pain  2  Patient will be able to perform perform IADL with minimal to no pain     3  Patient will be independent in [facility]  Patient would benefit from: skilled PT and PT eval  Planned modality interventions: electrical stimulation/Russian stimulation and cryotherapy  Planned therapy interventions: home exercise program, manual therapy, patient education, postural training, strengthening, stretching, therapeutic activities, therapeutic exercise, joint mobilization and IADL retraining  Frequency: 2x week  Duration in visits: 12  Duration in weeks: 6  Treatment plan discussed with: patient and PTA  Plan details: Thank you for referring this patient to Physical Therapy at [place] [address] and for the opportunity to coordinate care  Subjective Evaluation    History of Present Illness  Mechanism of injury: Patient reports to  with R shoulder pain  Patient reports R shoulder pain began in 2017, where patient noticed "clicking sometimes" reporting "uncomfortable" in bilateral shoulders without any specific MARVIN  Patient reports "slowly got knots and got to the point where it was hurting all the time, even waking up " Patient reports "been dealing with it since then " Patient reports she was given Meloxicam which "helped a lot " Patient denies N/T at this time  Pain  Current pain ratin  At best pain ratin  At worst pain ratin  Location: R shoulder and L shoulder    Quality: discomfort and sharp  Relieving factors: medications  Aggravating factors: lifting    Social Support    Employment status: working (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, lift, chop, and prepare food all of which can cause her pain  )  Hand dominance: right      Diagnostic Tests  No diagnostic tests performed  Treatments  Previous treatment: medication  Current treatment: medication and physical therapy  Patient Goals  Patient goals for therapy: decreased pain, increased motion and increased strength  Patient goal: "to not have pain in my shoulder "         Objective     Postural Observations  Seated posture: fair  Standing posture: fair    Additional Postural Observation Details  Patient demonstrates increased thoracic kyphosis, cervical protrusion, and rounded shoulders     Palpation   Left   Hypertonic in the upper trapezius  Tenderness of the biceps, levator scapulae and lower trapezius  Trigger point to lower trapezius and upper trapezius  Right   Hypertonic in the upper trapezius   Tenderness of the biceps, levator scapulae and lower trapezius  Trigger point to lower trapezius and upper trapezius  Tenderness     Left Shoulder   Tenderness in the acromion and clavicle  Right Shoulder  Tenderness in the acromion and clavicle  Cervical/Thoracic Screen   Cervical range of motion within normal limits with the following exceptions: Cervical flexion: 50 degrees  Cervical extension: 40 degrees  R lateral flexion: 25 degrees  L lateral flexion: 20 degrees  R lateral rotation: 65 degrees  L lateral rotation: 65 degrees  * Patient reports pain with all ROM in R shoulder  Thoracic range of motion within normal limits  Thoracic range of motion within normal limits with the following exceptions: 50% bilateral thoracic rotation with pain reported bilaterally  Neurological Testing     Sensation     Shoulder   Left Shoulder   Intact: light touch    Right Shoulder   Intact: light touch    Additional Neurological Details  Patient does report "numbness" along upper lip and bilateral C8 dermatomes along bilateral forearms reporting "I think that's because of anxiety "     Active Range of Motion   Left Shoulder   Flexion: 150 degrees with pain  Abduction: 140 degrees with pain    Right Shoulder   Flexion: 150 degrees with pain  Abduction: 140 degrees with pain    Additional Active Range of Motion Details  *patient demonstrating inconsistencies with AROM and PROM throughout treatment session  Passive Range of Motion   Left Shoulder   Flexion: 140 degrees with pain  Abduction: 110 degrees with pain  External rotation 45°: 50 degrees with pain  Internal rotation 45°: 50 degrees with pain    Right Shoulder   Flexion: 130 degrees with pain  Abduction: 100 degrees with pain  External rotation 45°: 50 degrees with pain  Internal rotation 45°: 50 degrees with pain    Additional Passive Range of Motion Details  *patient demonstrating inconsistencies with AROM and PROM throughout treatment session       Tests Left Shoulder   Positive apprehension, empty can, Hawkin's, lift-off and Neer's  Right Shoulder   Positive apprehension, empty can, Hawkin's, lift-off and Neer's  Additional Tests Details  *patient demonstrating inconsistencies throughout IE , reporting R shoulder pain when PT performing special tests on L shoulder       General Comments     Shoulder Comments   CONSTITUTIONAL: No fever, no chills, no malaise, no recent weight loss or gain   EYES: No complaints of vision changes, no red eyes, no diplopia   ENT: no loss of hearing, no tinnitus, no nosebleeds, no sore throat, no dysphasia, no dysphagia  CARDIOVASCULAR: no complaints of chest pain, no palpitations, no LE edema  RESPIRATORY: no complaints of SOB, no wheezing, no cough  GASTROINTESTINAL: no reports of abdominal pain, no constipation, no diarrhea, no vomiting, no bloody stools, no other changes in digestion, no loss of control of bowel  GENITOURINARY: no reports of dysuria, no incontinence, no loss of control of bladder; patient has bladder retention  INTEGUMENTARY: no complaints of skin rash or irritation, no bleeding or drainage   NEUROLOGICAL:  DTR, myotomes, and dermatomes performed in neurological section; patient has narcolepsy  ALLERGIES: bananas, apples, pumpkin, bees  SURGERIES: bilateral tubes in ears, teeth removal  MEDICATIONS: Meloxicam         Precautions: Anxiety, back pain, depression, headaches, sleep dysfunction,     Daily Treatment Diary     Manual  1/29            Bilateral shoulder ROM                                                                     Exercise Diary  1/29            Upper trapezius stretch             Levator stretch             Shoulder isometrics- all planes             Scapular retraction             Table slides             Biceps curls             Chin tucks             TB rows             TB LPD              sidelying ER Modalities

## 2018-02-05 ENCOUNTER — APPOINTMENT (OUTPATIENT)
Dept: PHYSICAL THERAPY | Facility: REHABILITATION | Age: 23
End: 2018-02-05
Payer: COMMERCIAL

## 2018-02-05 ENCOUNTER — OFFICE VISIT (OUTPATIENT)
Dept: PHYSICAL THERAPY | Facility: REHABILITATION | Age: 23
End: 2018-02-05
Payer: COMMERCIAL

## 2018-02-05 DIAGNOSIS — M25.511 CHRONIC RIGHT SHOULDER PAIN: Primary | ICD-10-CM

## 2018-02-05 DIAGNOSIS — G89.29 CHRONIC RIGHT SHOULDER PAIN: Primary | ICD-10-CM

## 2018-02-05 PROCEDURE — 97110 THERAPEUTIC EXERCISES: CPT

## 2018-02-05 PROCEDURE — 97112 NEUROMUSCULAR REEDUCATION: CPT

## 2018-02-05 PROCEDURE — 97140 MANUAL THERAPY 1/> REGIONS: CPT

## 2018-02-05 NOTE — PROGRESS NOTES
Daily Note     Today's date: 2018  Patient name: Ludwig Steven  : 1995  MRN: [id number]  Referring provider: ALVARO Baker  Dx:   Encounter Diagnosis   Name Primary?  Chronic right shoulder pain Yes                  Subjective: Patient reports minimal shoulder soreness prior to session today, right more than left  She reports compliance with HEP  Objective: See treatment diary below    Precautions: Anxiety, back pain, depression, headaches, sleep dysfunction,      Daily Treatment Diary      Manual                     Bilateral shoulder ROM    15 min                                                                                                                         Exercise Diary                     Upper trapezius stretch    :05x10 ea                   Levator stretch    :05x10 ea                   Shoulder isometrics- all planes    :05x10 ea                   Scapular retraction    2x10                   Table slides    10x                   Biceps curls   3# 2x10                   Chin tucks    2x10                   TB rows                       TB LPD                        sidelying ER     2x10 ea                                                                                                                                                                                                                                                                         Modalities                                                                                                  Assessment: Tolerated treatment fair  Initiated POC this session where patient had fair tolerance, fatigue and increased soreness noted with most Te, however patient states "feeling better" at end of session today  Patient demonstrated fatigue post treatment and would benefit from continued PT      Plan: Continue per plan of care

## 2018-02-12 ENCOUNTER — OFFICE VISIT (OUTPATIENT)
Dept: PHYSICAL THERAPY | Facility: REHABILITATION | Age: 23
End: 2018-02-12
Payer: COMMERCIAL

## 2018-02-12 DIAGNOSIS — M25.511 CHRONIC RIGHT SHOULDER PAIN: Primary | ICD-10-CM

## 2018-02-12 DIAGNOSIS — G89.29 CHRONIC RIGHT SHOULDER PAIN: Primary | ICD-10-CM

## 2018-02-12 PROCEDURE — 97110 THERAPEUTIC EXERCISES: CPT | Performed by: PHYSICAL THERAPIST

## 2018-02-12 PROCEDURE — 97140 MANUAL THERAPY 1/> REGIONS: CPT | Performed by: PHYSICAL THERAPIST

## 2018-02-12 NOTE — PROGRESS NOTES
Daily Note     Today's date: 2018  Patient name: Diane Shrestha  : 1995  MRN: [id number]  Referring provider: ALVARO Rainey  Dx:   Encounter Diagnosis   Name Primary?  Chronic right shoulder pain Yes                 Subjective: Patient reports minimal pain in bilateral shoulders upon arrival to therapy today  Patient reporting "clicking" at times  Objective: See treatment diary below  Precautions: Anxiety, back pain, depression, headaches, sleep dysfunction,      Daily Treatment Diary      Manual                   Bilateral shoulder ROM    15 min 12 min                                                                                                                       Exercise Diary                   Upper trapezius stretch    :05x10 ea  :05x 10 ea                 Levator stretch    :05x10 ea :05x10 ea                 Shoulder isometrics- all planes    :05x10 ea :05x10 ea                 Scapular retraction    2x10 2x10                 Table slides    10x 2x10                 Biceps curls   3# 2x10 3# 2x10                Chin tucks    2x10 Supine 2x10                 TB rows      NV                 TB LPD       NV                 sidelying ER     2x10 ea  2x10 ea                 Pulleys     3 min                 Sidelying flexion     2x10 ea                                                                                                                                                                                                                       Modalities                                                                                                    Assessment: Tolerated treatment fair   Patient demonstrated fatigue post treatment, exhibited good technique with therapeutic exercises and would benefit from continued PT   Added pulleys today with patient reporting "feel it clicking in there a little " Patient demonstrating improvements with PROM as well  Plan: Continue per plan of care  and Progress treatment as tolerated

## 2018-02-15 ENCOUNTER — APPOINTMENT (OUTPATIENT)
Dept: PHYSICAL THERAPY | Facility: REHABILITATION | Age: 23
End: 2018-02-15
Payer: COMMERCIAL

## 2018-02-19 ENCOUNTER — OFFICE VISIT (OUTPATIENT)
Dept: PHYSICAL THERAPY | Facility: REHABILITATION | Age: 23
End: 2018-02-19
Payer: COMMERCIAL

## 2018-02-19 DIAGNOSIS — M25.511 CHRONIC RIGHT SHOULDER PAIN: Primary | ICD-10-CM

## 2018-02-19 DIAGNOSIS — G89.29 CHRONIC RIGHT SHOULDER PAIN: Primary | ICD-10-CM

## 2018-02-19 PROCEDURE — 97110 THERAPEUTIC EXERCISES: CPT | Performed by: PHYSICAL THERAPIST

## 2018-02-19 PROCEDURE — 97140 MANUAL THERAPY 1/> REGIONS: CPT | Performed by: PHYSICAL THERAPIST

## 2018-02-19 NOTE — PROGRESS NOTES
Daily Note     Today's date: 2018  Patient name: Haim Dale  : 1995  MRN: [id number]  Referring provider: ALVARO Chamorro  Dx:   Encounter Diagnosis     ICD-10-CM    1  Chronic right shoulder pain M25 511     G89 [id number]                   Subjective: Patient reports minimal pain in bilateral shoulders upon arrival to therapy today  She denies any increased pain or soreness following previous therapy session  Objective: See treatment diary below  Precautions: Anxiety, back pain, depression, headaches, sleep dysfunction,      Daily Treatment Diary      Manual                 Bilateral shoulder ROM    15 min 12 min 12 min                                                                                                                     Exercise Diary                 Upper trapezius stretch    :05x10 ea  :05x 10 ea :05x10 ea               Levator stretch    :05x10 ea :05x10 ea :05x10 ea               Shoulder isometrics- all planes    :05x10 ea :05x10 ea :05x10 ea               Scapular retraction    2x10 2x10 2x10               Table slides    10x 2x10 NP               Biceps curls   3# 2x10 3# 2x10 3# 2x10               Chin tucks    2x10 Supine 2x10  Supine 2x10               TB rows      NV  NV               TB LPD       NV  NV               sidelying ER     2x10 ea  2x10 ea  1# 2x8               Pulleys     3 min 3 min               Sidelying flexion     2x10 ea 2x12 ea               Scap punches       1# 2x10                                                                                                                                                                                           Assessment: Tolerated treatment fair  Patient demonstrated fatigue post treatment, exhibited good technique with therapeutic exercises and would benefit from continued PT  Patient progressed to 1# with sidelying ER today   Added scap punches today with fair tolerance, no increase in pain  Plan: Continue per plan of care  Progress treatment as tolerated

## 2018-02-22 ENCOUNTER — OFFICE VISIT (OUTPATIENT)
Dept: PHYSICAL THERAPY | Facility: REHABILITATION | Age: 23
End: 2018-02-22
Payer: COMMERCIAL

## 2018-02-22 DIAGNOSIS — M25.512 BILATERAL SHOULDER PAIN, UNSPECIFIED CHRONICITY: Primary | ICD-10-CM

## 2018-02-22 DIAGNOSIS — M25.511 BILATERAL SHOULDER PAIN, UNSPECIFIED CHRONICITY: Primary | ICD-10-CM

## 2018-02-22 DIAGNOSIS — M25.511 RIGHT SHOULDER PAIN, UNSPECIFIED CHRONICITY: ICD-10-CM

## 2018-02-22 PROCEDURE — 97140 MANUAL THERAPY 1/> REGIONS: CPT

## 2018-02-22 PROCEDURE — 97110 THERAPEUTIC EXERCISES: CPT

## 2018-02-22 PROCEDURE — 97112 NEUROMUSCULAR REEDUCATION: CPT

## 2018-02-22 NOTE — PROGRESS NOTES
Daily Note     Today's date: 2018  Patient name: Meño Campbell  : 1995  MRN: [id number]  Referring provider: ALVARO Paniagua  Dx:   Encounter Diagnosis     ICD-10-CM    1  Bilateral shoulder pain, unspecified chronicity M25 511     M25 512    2  Right shoulder pain, unspecified chronicity M25 511                   Subjective: Patient denies any shoulder pain, just minimal occasional soreness  She reports minimal soreness after previous session  Objective: See treatment diary below  Precautions: Anxiety, back pain, depression, headaches, sleep dysfunction,      Daily Treatment Diary      Manual               Bilateral shoulder ROM    15 min 12 min 12 min  12 min                                                                                                                   Exercise Diary               Upper trapezius stretch    :05x10 ea  :05x 10 ea :05x10 ea  :05x10 ea             Levator stretch    :05x10 ea :05x10 ea :05x10 ea  :05x10 ea             Shoulder isometrics- all planes    :05x10 ea :05x10 ea :05x10 ea               Scapular retraction    2x10 2x10 2x10  3x10             Table slides    10x 2x10 NP               Biceps curls   3# 2x10 3# 2x10 3# 2x10  3# 3x10             Chin tucks    2x10 Supine 2x10  Supine 2x10  supine 2x10             TB rows      NV  NV  peach 2x10             TB LPD       NV  NV  peach 2x10             sidelying ER     2x10 ea  2x10 ea  1# 2x8  1# 3x10             Pulleys     3 min 3 min  5min             Sidelying flexion     2x10 ea 2x12 ea  3x10             Scap punches       1# 2x10  1# 2x10                                                                                                                                                                                             Assessment: Tolerated treatment well   Trialed TB LPD and rows this session where patient tolerated well, no pain noted just increased fatigue  Patient demonstrated fatigue post treatment and would benefit from continued PT      Plan: Continue per plan of care

## 2018-02-26 ENCOUNTER — OFFICE VISIT (OUTPATIENT)
Dept: PHYSICAL THERAPY | Facility: REHABILITATION | Age: 23
End: 2018-02-26
Payer: COMMERCIAL

## 2018-02-26 DIAGNOSIS — M25.511 BILATERAL SHOULDER PAIN, UNSPECIFIED CHRONICITY: Primary | ICD-10-CM

## 2018-02-26 DIAGNOSIS — M25.512 BILATERAL SHOULDER PAIN, UNSPECIFIED CHRONICITY: Primary | ICD-10-CM

## 2018-02-26 DIAGNOSIS — M25.511 CHRONIC RIGHT SHOULDER PAIN: ICD-10-CM

## 2018-02-26 DIAGNOSIS — G89.29 CHRONIC RIGHT SHOULDER PAIN: ICD-10-CM

## 2018-02-26 DIAGNOSIS — M25.511 RIGHT SHOULDER PAIN, UNSPECIFIED CHRONICITY: ICD-10-CM

## 2018-02-26 PROCEDURE — 97112 NEUROMUSCULAR REEDUCATION: CPT

## 2018-02-26 PROCEDURE — 97110 THERAPEUTIC EXERCISES: CPT

## 2018-02-26 PROCEDURE — 97140 MANUAL THERAPY 1/> REGIONS: CPT

## 2018-02-26 NOTE — PROGRESS NOTES
Daily Note     Today's date: 2018  Patient name: Miguel Yen  : 1995  MRN: [id number]  Referring provider: ALVARO Oneil  Dx:   Encounter Diagnosis     ICD-10-CM    1  Bilateral shoulder pain, unspecified chronicity M25 511     M25 512    2  Right shoulder pain, unspecified chronicity M25 511    3  Chronic right shoulder pain M25 511     G89 [id number]                   Subjective: Patient reports minimal bilateral shoulder soreness prior to session and reports moderate soreness after previous session, with soreness subsiding the next day  She reports attempting to reach under bed for something where she experienced increased pain         Objective: See treatment diary below    Precautions: Anxiety, back pain, depression, headaches, sleep dysfunction,      Daily Treatment Diary      Manual             Bilateral shoulder ROM    15 min 12 min 12 min  12 min  13 min                                                                                                                 Exercise Diary             Upper trapezius stretch    :05x10 ea  :05x 10 ea :05x10 ea  :05x10 ea  :05x10 ea           Levator stretch    :05x10 ea :05x10 ea :05x10 ea  :05x10 ea  :05x10 ea           Shoulder isometrics- all planes    :05x10 ea :05x10 ea :05x10 ea               Scapular retraction    2x10 2x10 2x10  3x10  3x10           Table slides    10x 2x10 NP               Biceps curls   3# 2x10 3# 2x10 3# 2x10  3# 3x10  3#           Chin tucks    2x10 Supine 2x10  Supine 2x10  supine 2x10  2x10           TB rows      NV  NV  peach 2x10  orange 3x10           TB LPD       NV  NV  peach 2x10  orange 3x10           sidelying ER     2x10 ea  2x10 ea  1# 2x8  1# 3x10  1# 3x10           Pulleys     3 min 3 min  5min  5 min           Sidelying flexion     2x10 ea 2x12 ea  3x10  3x12           Scap punches       1# 2x10  1# 2x10  1# 2x10                                                                                                                                                                                         Assessment: Tolerated treatment well  Trialed increased reps with sidelying ER and flexion where patient reports increased discomfort towards end of completion  Patient demonstrated fatigue post treatment and would benefit from continued PT      Plan: Continue per plan of care  Progress treatment as tolerated

## 2018-03-01 ENCOUNTER — OFFICE VISIT (OUTPATIENT)
Dept: PHYSICAL THERAPY | Facility: REHABILITATION | Age: 23
End: 2018-03-01
Payer: COMMERCIAL

## 2018-03-01 DIAGNOSIS — M25.511 CHRONIC RIGHT SHOULDER PAIN: ICD-10-CM

## 2018-03-01 DIAGNOSIS — G89.29 CHRONIC RIGHT SHOULDER PAIN: ICD-10-CM

## 2018-03-01 DIAGNOSIS — M25.512 BILATERAL SHOULDER PAIN, UNSPECIFIED CHRONICITY: Primary | ICD-10-CM

## 2018-03-01 DIAGNOSIS — M25.511 BILATERAL SHOULDER PAIN, UNSPECIFIED CHRONICITY: Primary | ICD-10-CM

## 2018-03-01 DIAGNOSIS — M25.511 RIGHT SHOULDER PAIN, UNSPECIFIED CHRONICITY: ICD-10-CM

## 2018-03-01 PROCEDURE — G8984 CARRY CURRENT STATUS: HCPCS | Performed by: PHYSICAL THERAPIST

## 2018-03-01 PROCEDURE — G8985 CARRY GOAL STATUS: HCPCS | Performed by: PHYSICAL THERAPIST

## 2018-03-01 PROCEDURE — 97140 MANUAL THERAPY 1/> REGIONS: CPT | Performed by: PHYSICAL THERAPIST

## 2018-03-01 PROCEDURE — 97110 THERAPEUTIC EXERCISES: CPT | Performed by: PHYSICAL THERAPIST

## 2018-03-01 NOTE — PROGRESS NOTES
PT Re-Evaluation     Today's date: 3/1/2018  Patient name: Miguel Yen  : 1995  MRN: [id number]  Referring provider: ALVARO Oneil  Dx:   Encounter Diagnosis     ICD-10-CM    1  Bilateral shoulder pain, unspecified chronicity M25 511     M25 512    2  Right shoulder pain, unspecified chronicity M25 511    3  Chronic right shoulder pain M25 511     G89 [id number]                   Assessment  Impairments: abnormal or restricted ROM, activity intolerance, impaired physical strength and pain with function  Functional limitations: Patient reports to evaluation with Chronic right shoulder pain  (primary encounter diagnosis)Pain in right shoulder with the following functional impairments: decreased ability of reaching, lifting, donning and doffing clothing, and work duties  Patient is not irritable  Assessment details: Miguel Yen is a 23y o  year old female who presents to IE with:   Chronic right shoulder pain  (primary encounter diagnosis)  Pain in right shoulder    [place] presented with the impairments as listed above when beginning OP Physical Therapy  [facility] has made the following improvements since beginning PT: decreased pain, increased ROM, strength, and tolerance to activities   [place] continues to present with the following deficits: pain, decreased ROM, strength, and tolerance to activities  Patient would continue to benefit from skilled PT to address these deficits and to maximize function  Understanding of Dx/Px/POC: good   Prognosis: good  Prognosis details: Patient's prognosis may be affected by the following: anxiety, depression, bladder retention, narcolepsy, back pain    Goals  Short-term goals:   1  Patient's pain will be decreased to 3/10 within 4 weeks- Partially Met  2  Patient's strength will increase to 4-/5 within 4 weeks - Partially Met    Long-term goals:   1  Patient will be able to perform lift bilateral UE with minimal to no pain  - Partially Met  2   Patient will be able to perform perform IADL with minimal to no pain  - Partially Met  3  Patient will be independent in HEP - Partially Met    Plan  Patient would benefit from: skilled PT and PT eval  Planned modality interventions: electrical stimulation/Russian stimulation and cryotherapy  Planned therapy interventions: home exercise program, manual therapy, patient education, postural training, strengthening, stretching, therapeutic activities, therapeutic exercise, joint mobilization and IADL retraining  Frequency: 2x week  Duration in visits: 8  Duration in weeks: 4  Treatment plan discussed with: patient and PTA  Plan details: Thank you for referring this patient to Physical Therapy at [place] [address] and for the opportunity to coordinate care  Subjective Evaluation    History of Present Illness  Mechanism of injury: Patient has been seen for total of 7 visits for OP PT for bilateral shoulder pain  Patient rates overall improvement since beginning PT 50%  Moderately better (4)  Patient reports improvements with lifting, reaching, overhead activity  Patient reports most difficulty with reaching, lifting, overhead activity, repetitive shoulder movements while at work including chopping and using salad spinner   She reports "I can tell it's definitely better, it doesn't hurt sleeping at night and I don't wake up with pain "     Quality of life: fair    Pain  Current pain ratin  At best pain ratin  At worst pain ratin  Quality: dull ache  Aggravating factors: overhead activity and lifting    Treatments  Previous treatment: physical therapy  Current treatment: physical therapy  Patient Goals  Patient goals for therapy: decreased pain, increased motion, increased strength and independence with ADLs/IADLs          Objective     Postural Observations  Seated posture: fair  Standing posture: fair    Additional Postural Observation Details  Patient demonstrates increased thoracic kyphosis, cervical protrusion, and rounded shoulders     Palpation   Left   Hypertonic in the upper trapezius  Tenderness of the biceps, levator scapulae and lower trapezius  Trigger point to lower trapezius and upper trapezius  Right   Hypertonic in the upper trapezius  Tenderness of the biceps, levator scapulae and lower trapezius  Trigger point to lower trapezius and upper trapezius  Tenderness     Left Shoulder   Tenderness in the acromion and clavicle  Right Shoulder  Tenderness in the acromion and clavicle  Cervical/Thoracic Screen   Cervical range of motion within normal limits  Cervical range of motion within normal limits with the following exceptions: Cervical flexion: 50 degrees  Cervical extension: 40 degrees  R lateral flexion: 25 degrees  L lateral flexion: 25 degrees  R lateral rotation: 65 degrees  L lateral rotation: 65 degrees  * Patient reports pain with all ROM in R shoulder  Thoracic range of motion within normal limits  Thoracic range of motion within normal limits with the following exceptions: 50% bilateral thoracic rotation with pain reported bilaterally  Neurological Testing     Sensation     Shoulder   Left Shoulder   Intact: light touch    Right Shoulder   Intact: light touch    Additional Neurological Details  Patient does report "numbness" along upper lip and bilateral C8 dermatomes along bilateral forearms reporting "I think that's because of anxiety "     Active Range of Motion   Left Shoulder   Flexion: 160 degrees   Abduction: 160 degrees     Right Shoulder   Flexion: 160 degrees   Abduction: 160 degrees     Additional Active Range of Motion Details  *patient demonstrating inconsistencies with AROM and PROM throughout treatment session       Passive Range of Motion   Left Shoulder   Flexion: 160 degrees   Abduction: 160 degrees   External rotation 45°: 80 degrees   Internal rotation 45°: 60 degrees     Right Shoulder   Flexion: 160 degrees   Abduction: 160 degrees   External rotation 45°: 80 degrees   Internal rotation 45°: 60 degrees     Additional Passive Range of Motion Details  *patient demonstrating inconsistencies with AROM and PROM throughout treatment session  Tests     Left Shoulder   Positive apprehension, empty can, Hawkin's, lift-off and Neer's  Right Shoulder   Positive apprehension, empty can, Hawkin's, lift-off and Neer's  Additional Tests Details  *patient demonstrating inconsistencies throughout IE , reporting R shoulder pain when PT performing special tests on L shoulder       General Comments     Shoulder Comments   CONSTITUTIONAL: No fever, no chills, no malaise, no recent weight loss or gain   EYES: No complaints of vision changes, no red eyes, no diplopia   ENT: no loss of hearing, no tinnitus, no nosebleeds, no sore throat, no dysphasia, no dysphagia  CARDIOVASCULAR: no complaints of chest pain, no palpitations, no LE edema  RESPIRATORY: no complaints of SOB, no wheezing, no cough  GASTROINTESTINAL: no reports of abdominal pain, no constipation, no diarrhea, no vomiting, no bloody stools, no other changes in digestion, no loss of control of bowel  GENITOURINARY: no reports of dysuria, no incontinence, no loss of control of bladder; patient has bladder retention  INTEGUMENTARY: no complaints of skin rash or irritation, no bleeding or drainage   NEUROLOGICAL:  DTR, myotomes, and dermatomes performed in neurological section; patient has narcolepsy  ALLERGIES: bananas, apples, pumpkin, bees  SURGERIES: bilateral tubes in ears, teeth removal  MEDICATIONS: Meloxicam           Precautions: Anxiety, back pain, depression, headaches, sleep dysfunction,      Daily Treatment Diary      Manual  1/29  2/5  2/12 2/19  2/22  2/26  3/1         Bilateral shoulder ROM    15 min 12 min 12 min  12 min  13 min  12 min                                                                                                               Exercise Diary  1/29  2/5  2/12 2/19  2/22  2/26  3/1       Upper trapezius stretch    :05x10 ea  :05x 10 ea :05x10 ea  :05x10 ea  :05x10 ea          Levator stretch    :05x10 ea :05x10 ea :05x10 ea  :05x10 ea  :05x10 ea           Shoulder isometrics- all planes    :05x10 ea :05x10 ea :05x10 ea               Scapular retraction    2x10 2x10 2x10  3x10  3x10 3x10         Table slides    10x 2x10 NP               Biceps curls   3# 2x10 3# 2x10 3# 2x10  3# 3x10  3# 3# 3x10         Chin tucks    2x10 Supine 2x10  Supine 2x10  supine 2x10  2x10           TB rows      NV  NV  peach 2x10  orange 3x10 OTB 3x10         TB LPD       NV  NV  peach 2x10  orange 3x10 OTB 3x10         sidelying ER     2x10 ea  2x10 ea  1# 2x8  1# 3x10  1# 3x10  1# 3x10         Pulleys     3 min 3 min  5min  5 min 5 min         Sidelying flexion/ abd     2x10 ea 2x12 ea  3x10  3x12 3x12  2x10         Scap punches       1# 2x10  1# 2x10  1# 2x10 1# 3x10

## 2018-03-08 ENCOUNTER — OFFICE VISIT (OUTPATIENT)
Dept: PHYSICAL THERAPY | Facility: REHABILITATION | Age: 23
End: 2018-03-08
Payer: COMMERCIAL

## 2018-03-08 DIAGNOSIS — M25.511 BILATERAL SHOULDER PAIN, UNSPECIFIED CHRONICITY: Primary | ICD-10-CM

## 2018-03-08 DIAGNOSIS — M25.511 CHRONIC RIGHT SHOULDER PAIN: ICD-10-CM

## 2018-03-08 DIAGNOSIS — M25.511 RIGHT SHOULDER PAIN, UNSPECIFIED CHRONICITY: ICD-10-CM

## 2018-03-08 DIAGNOSIS — G89.29 CHRONIC RIGHT SHOULDER PAIN: ICD-10-CM

## 2018-03-08 DIAGNOSIS — M25.512 BILATERAL SHOULDER PAIN, UNSPECIFIED CHRONICITY: Primary | ICD-10-CM

## 2018-03-08 PROCEDURE — 97140 MANUAL THERAPY 1/> REGIONS: CPT | Performed by: PHYSICAL THERAPIST

## 2018-03-08 PROCEDURE — 97110 THERAPEUTIC EXERCISES: CPT | Performed by: PHYSICAL THERAPIST

## 2018-03-08 NOTE — PROGRESS NOTES
Daily Note     Today's date: 3/8/2018  Patient name: Kaushal Aceves  : 1995  MRN: [id number]  Referring provider: ALVARO Salazar  Dx:   Encounter Diagnosis     ICD-10-CM    1  Bilateral shoulder pain, unspecified chronicity M25 511     M25 512    2  Right shoulder pain, unspecified chronicity M25 511    3   Chronic right shoulder pain M25 511     G89 29                   Subjective: Patient reporting minimal soreness in bilateral shoulders upon arrival to therapy today, reporting "could tell I missed Monday's appointment "       Objective: See treatment diary below  Precautions: Anxiety, back pain, depression, headaches, sleep dysfunction,      Daily Treatment Diary      Manual  1/29  2/5  2/12 2/19  2/22  2/26  3/1  3/8       Bilateral shoulder ROM    15 min 12 min 12 min  12 min  13 min  12 min 12 min                                                                                                             Exercise Diary  1/29  2/5  2/12 2/19  2/22  2/26  3/1  3/8       Upper trapezius stretch    :05x10 ea  :05x 10 ea :05x10 ea  :05x10 ea  :05x10 ea          Levator stretch    :05x10 ea :05x10 ea :05x10 ea  :05x10 ea  :05x10 ea           Shoulder isometrics- all planes    :05x10 ea :05x10 ea :05x10 ea               Scapular retraction    2x10 2x10 2x10  3x10  3x10 3x10  3x10       Table slides    10x 2x10 NP               Biceps curls   3# 2x10 3# 2x10 3# 2x10  3# 3x10  3# 3# 3x10  3# 3x10       Chin tucks    2x10 Supine 2x10  Supine 2x10  supine 2x10  2x10           TB rows      NV  NV  peach 2x10  orange 3x10 OTB 3x10  OTB 3x10       TB LPD       NV  NV  peach 2x10  orange 3x10 OTB 3x10  OTB 3x10       sidelying ER     2x10 ea  2x10 ea  1# 2x8  1# 3x10  1# 3x10  1# 3x10 1# 3x10       Pulleys     3 min 3 min  5min  5 min 5 min 5 min       Sidelying flexion/ abd     2x10 ea 2x12 ea  3x10  3x12 3x12  2x10  3x12 2x10       Scap punches       1# 2x10  1# 2x10  1# 2x10 1# 3x10         Alternating shoulder flexion to 90                10 ea       Prone rows               1# 2x10 ea                                                                                                                                     Assessment: Tolerated treatment fair  Patient demonstrated fatigue post treatment, exhibited good technique with therapeutic exercises and would benefit from continued PT  Added prone rows and alternating shoulder flexion today with fair tolerance, no increase in pain, patient reporting "little sore "     Plan: Continue per plan of care  Progress treatment as tolerated

## 2018-03-12 ENCOUNTER — OFFICE VISIT (OUTPATIENT)
Dept: PHYSICAL THERAPY | Facility: REHABILITATION | Age: 23
End: 2018-03-12
Payer: COMMERCIAL

## 2018-03-12 DIAGNOSIS — M25.512 BILATERAL SHOULDER PAIN, UNSPECIFIED CHRONICITY: Primary | ICD-10-CM

## 2018-03-12 DIAGNOSIS — M25.511 BILATERAL SHOULDER PAIN, UNSPECIFIED CHRONICITY: Primary | ICD-10-CM

## 2018-03-12 DIAGNOSIS — G89.29 CHRONIC RIGHT SHOULDER PAIN: ICD-10-CM

## 2018-03-12 DIAGNOSIS — M25.511 CHRONIC RIGHT SHOULDER PAIN: ICD-10-CM

## 2018-03-12 DIAGNOSIS — M25.511 RIGHT SHOULDER PAIN, UNSPECIFIED CHRONICITY: ICD-10-CM

## 2018-03-12 PROCEDURE — 97140 MANUAL THERAPY 1/> REGIONS: CPT

## 2018-03-12 PROCEDURE — 97110 THERAPEUTIC EXERCISES: CPT

## 2018-03-12 NOTE — PROGRESS NOTES
Daily Note     Today's date: 3/12/2018  Patient name: Gonzalo Gordon  : 1995  MRN: [id number]  Referring provider: ALVARO Adams  Dx:   Encounter Diagnosis     ICD-10-CM    1  Bilateral shoulder pain, unspecified chronicity M25 511     M25 512    2  Right shoulder pain, unspecified chronicity M25 511    3   Chronic right shoulder pain M25 511     G89 29                   Subjective: Patient denies any shoulder pain prior to session today and reports no complaints after previous session  "it feels better, it's been feeling stronger "       Objective: See treatment diary below  Precautions: Anxiety, back pain, depression, headaches, sleep dysfunction,      Daily Treatment Diary      Manual  1/29  2/5  2/12 2/19  2/22  2/26  3/1  3/8  3/12     Bilateral shoulder ROM    15 min 12 min 12 min  12 min  13 min  12 min 12 min  12 min                                                                                                           Exercise Diary  1/29  2/5  2/12 2/19  2/22  2/26  3/1  3/8  3/12     Upper trapezius stretch    :05x10 ea  :05x 10 ea :05x10 ea  :05x10 ea  :05x10 ea          Levator stretch    :05x10 ea :05x10 ea :05x10 ea  :05x10 ea  :05x10 ea           Shoulder isometrics- all planes    :05x10 ea :05x10 ea :05x10 ea               Scapular retraction    2x10 2x10 2x10  3x10  3x10 3x10  3x10       Table slides    10x 2x10 NP               Biceps curls   3# 2x10 3# 2x10 3# 2x10  3# 3x10  3# 3# 3x10  3# 3x10       Chin tucks    2x10 Supine 2x10  Supine 2x10  supine 2x10  2x10           TB rows      NV  NV  peach 2x10  orange 3x10 OTB 3x10  OTB 3x10  OTB 3x10     TB LPD       NV  NV  peach 2x10  orange 3x10 OTB 3x10  OTB 3x10  OTB 3x10     sidelying ER     2x10 ea  2x10 ea  1# 2x8  1# 3x10  1# 3x10  1# 3x10 1# 3x10  1# 3x12     Pulleys     3 min 3 min  5min  5 min 5 min 5 min  5 min     Sidelying flexion/ abd     2x10 ea 2x12 ea  3x10  3x12 3x12  2x10  3x12 2x10  1# 2x10     Scap punches       1# 2x10  1# 2x10  1# 2x10 1# 3x10         Alternating shoulder flexion to 90                10 ea  10 ea     Prone rows               1# 2x10 ea  1# 3x10      Prone I, T, Y                  I only 2x10                                                                                                           Assessment: Tolerated treatment well  Triale prone I this session where patient tolerated well, reports of fatigue  Therapist instructed patient on effects of exercise with patient reporting understanding  Patient demonstrated fatigue post treatment and would benefit from continued PT      Plan: Continue per plan of care  Progress treatment as tolerated

## 2018-03-15 ENCOUNTER — APPOINTMENT (OUTPATIENT)
Dept: PHYSICAL THERAPY | Facility: REHABILITATION | Age: 23
End: 2018-03-15
Payer: COMMERCIAL

## 2018-03-19 ENCOUNTER — OFFICE VISIT (OUTPATIENT)
Dept: PHYSICAL THERAPY | Facility: REHABILITATION | Age: 23
End: 2018-03-19
Payer: COMMERCIAL

## 2018-03-19 DIAGNOSIS — M25.511 BILATERAL SHOULDER PAIN, UNSPECIFIED CHRONICITY: Primary | ICD-10-CM

## 2018-03-19 DIAGNOSIS — M25.512 BILATERAL SHOULDER PAIN, UNSPECIFIED CHRONICITY: Primary | ICD-10-CM

## 2018-03-19 PROCEDURE — 97110 THERAPEUTIC EXERCISES: CPT

## 2018-03-19 PROCEDURE — 97140 MANUAL THERAPY 1/> REGIONS: CPT

## 2018-03-19 RX ORDER — BUSPIRONE HYDROCHLORIDE 10 MG/1
1 TABLET ORAL 2 TIMES DAILY
COMMUNITY
End: 2018-09-24

## 2018-03-19 RX ORDER — MELOXICAM 15 MG/1
1 TABLET ORAL DAILY PRN
COMMUNITY
Start: 2018-01-15 | End: 2018-05-21 | Stop reason: ALTCHOICE

## 2018-03-19 RX ORDER — EPINEPHRINE 0.3 MG/.3ML
0.3 INJECTION SUBCUTANEOUS
COMMUNITY
Start: 2017-08-16

## 2018-03-19 RX ORDER — DULOXETIN HYDROCHLORIDE 60 MG/1
CAPSULE, DELAYED RELEASE ORAL
COMMUNITY
End: 2018-09-24

## 2018-03-19 RX ORDER — ALPRAZOLAM 0.5 MG/1
1 TABLET ORAL
COMMUNITY
End: 2018-09-24

## 2018-03-19 NOTE — PROGRESS NOTES
Daily Note     Today's date: 3/19/2018  Patient name: Barak Cochran  : 1995  MRN: [id number]  Referring provider: ALVARO Peraza  Dx:   Encounter Diagnosis     ICD-10-CM    1  Bilateral shoulder pain, unspecified chronicity M25 511     M25 512                   Subjective: Pt states that she moved over the weekend, and probably overdid it with lifting  She reports increased pain, tightness, and tingling in her fingers  Sx's did subside since they started over the weekend        Objective: See treatment diary below  Precautions: Anxiety, back pain, depression, headaches, sleep dysfunction,      Daily Treatment Diary      Manual  1/29  2/5  2/12 2/19  2/22  2/26  3/1  3/8  3/12  3/19   Bilateral shoulder ROM    15 min 12 min 12 min  12 min  13 min  12 min 12 min  12 min  15  min                                                                                                         Exercise Diary  1/29  2/5  2/12 2/19  2/22  2/26  3/1  3/8  3/12  3/19   Upper trapezius stretch    :05x10 ea  :05x 10 ea :05x10 ea  :05x10 ea  :05x10 ea          Levator stretch    :05x10 ea :05x10 ea :05x10 ea  :05x10 ea  :05x10 ea           Shoulder isometrics- all planes    :05x10 ea :05x10 ea :05x10 ea               Scapular retraction    2x10 2x10 2x10  3x10  3x10 3x10  3x10       Table slides    10x 2x10 NP               Biceps curls   3# 2x10 3# 2x10 3# 2x10  3# 3x10  3# 3# 3x10  3# 3x10    4#  3x10   Chin tucks    2x10 Supine 2x10  Supine 2x10  supine 2x10  2x10           TB rows      NV  NV  peach 2x10  orange 3x10 OTB 3x10  OTB 3x10  OTB 3x10  GTB  3x10   TB LPD       NV  NV  peach 2x10  orange 3x10 OTB 3x10  OTB 3x10  OTB 3x10  OTB  3x10   sidelying ER     2x10 ea  2x10 ea  1# 2x8  1# 3x10  1# 3x10  1# 3x10 1# 3x10  1# 3x12  1#  3x12   Pulleys     3 min 3 min  5min  5 min 5 min 5 min  5 min  5 min   Sidelying flexion/ abd     2x10 ea 2x12 ea  3x10  3x12 3x12  2x10  3x12 2x10  1# 2x10  1#  2X10   Scap punches       1# 2x10  1# 2x10  1# 2x10 1# 3x10         Alternating shoulder flexion to 90                10 ea  10 ea  10 ea   Prone rows               1# 2x10 ea  1# 3x10  1#  3x10    Prone I, T, Y                  I only 2x10  I   Only  2x10                                                                                                         Assessment: Tolerated treatment well, with reports of fatigue throughout Te  Pt reports decreased tightness and pain at end of session  Patient demonstrated fatigue post treatment and would benefit from continued PT      Plan: Continue per plan of care  Progress treatment as tolerated

## 2018-03-22 ENCOUNTER — OFFICE VISIT (OUTPATIENT)
Dept: PHYSICAL THERAPY | Facility: REHABILITATION | Age: 23
End: 2018-03-22
Payer: COMMERCIAL

## 2018-03-22 DIAGNOSIS — M25.512 BILATERAL SHOULDER PAIN, UNSPECIFIED CHRONICITY: Primary | ICD-10-CM

## 2018-03-22 DIAGNOSIS — G89.29 CHRONIC RIGHT SHOULDER PAIN: ICD-10-CM

## 2018-03-22 DIAGNOSIS — M25.511 RIGHT SHOULDER PAIN, UNSPECIFIED CHRONICITY: ICD-10-CM

## 2018-03-22 DIAGNOSIS — M25.511 CHRONIC RIGHT SHOULDER PAIN: ICD-10-CM

## 2018-03-22 DIAGNOSIS — M25.511 BILATERAL SHOULDER PAIN, UNSPECIFIED CHRONICITY: Primary | ICD-10-CM

## 2018-03-22 PROCEDURE — 97140 MANUAL THERAPY 1/> REGIONS: CPT

## 2018-03-22 PROCEDURE — 97112 NEUROMUSCULAR REEDUCATION: CPT

## 2018-03-22 PROCEDURE — 97110 THERAPEUTIC EXERCISES: CPT

## 2018-03-22 NOTE — PROGRESS NOTES
Daily Note     Today's date: 3/22/2018  Patient name: Jm Leahy  : 1995  MRN: [id number]  Referring provider: ALVARO Stanley  Dx:   Encounter Diagnosis     ICD-10-CM    1  Bilateral shoulder pain, unspecified chronicity M25 511     M25 512    2  Right shoulder pain, unspecified chronicity M25 511    3  Chronic right shoulder pain M25 511     G89 29                   Subjective: Patient denies any shoulder pain prior to session today just "tense" secondary to moving into new apartment   She reports no complaints from previous session stating "i felt great after leaving here the last time "       Objective: See treatment diary below  Precautions: Anxiety, back pain, depression, headaches, sleep dysfunction,      Daily Treatment Diary      Manual  3/22  2/5  2/12 2/19  2/22  2/26  3/1  3/8  3/12  3/19   Bilateral shoulder ROM  10 min  15 min 12 min 12 min  12 min  13 min  12 min 12 min  12 min  15  min                                                                                                         Exercise Diary  3/22  2/5  2/12 2/19  2/22  2/26  3/1  3/8  3/12  3/19   Upper trapezius stretch    :05x10 ea  :05x 10 ea :05x10 ea  :05x10 ea  :05x10 ea          Levator stretch    :05x10 ea :05x10 ea :05x10 ea  :05x10 ea  :05x10 ea           Shoulder isometrics- all planes    :05x10 ea :05x10 ea :05x10 ea               Scapular retraction    2x10 2x10 2x10  3x10  3x10 3x10  3x10       Table slides    10x 2x10 NP               Biceps curls   3# 2x10 3# 2x10 3# 2x10  3# 3x10  3# 3# 3x10  3# 3x10    4#  3x10   Chin tucks    2x10 Supine 2x10  Supine 2x10  supine 2x10  2x10           TB rows  GTB 3x10    NV  NV  peach 2x10  orange 3x10 OTB 3x10  OTB 3x10  OTB 3x10  GTB  3x10   TB LPD   GTB 3x10    NV  NV  peach 2x10  orange 3x10 OTB 3x10  OTB 3x10  OTB 3x10  OTB  3x10   sidelying ER   1# 3x15  2x10 ea  2x10 ea  1# 2x8  1# 3x10  1# 3x10  1# 3x10 1# 3x10  1# 3x12  1#  3x12   Pulleys 5 min   3 min 3 min  5min  5 min 5 min 5 min  5 min  5 min   Sidelying flexion/ abd  1# 2x12   2x10 ea 2x12 ea  3x10  3x12 3x12  2x10  3x12 2x10  1# 2x10  1#  2X10   Scap punches       1# 2x10  1# 2x10  1# 2x10 1# 3x10         Alternating shoulder flexion to 90  20 ea              10 ea  10 ea  10 ea   Prone rows  2# 3x10             1# 2x10 ea  1# 3x10  1#  3x10    Prone I, T, Y  I,T 3x10                I only 2x10  I   Only  2x10                                                                                                         Assessment: Tolerated treatment well  Patient demonstrated fatigue post treatment, exhibited good technique with therapeutic exercises and would benefit from continued PT Trialed increased reps/weight and prone T this session where patient tolerated well, no reports of increased shoulder discomfort with any TE performed, just muscle fatigue  Plan: Continue per plan of care  Progress treatment as tolerated

## 2018-03-26 ENCOUNTER — OFFICE VISIT (OUTPATIENT)
Dept: PHYSICAL THERAPY | Facility: REHABILITATION | Age: 23
End: 2018-03-26
Payer: COMMERCIAL

## 2018-03-26 DIAGNOSIS — M25.511 CHRONIC RIGHT SHOULDER PAIN: ICD-10-CM

## 2018-03-26 DIAGNOSIS — G89.29 CHRONIC RIGHT SHOULDER PAIN: ICD-10-CM

## 2018-03-26 DIAGNOSIS — M25.511 BILATERAL SHOULDER PAIN, UNSPECIFIED CHRONICITY: Primary | ICD-10-CM

## 2018-03-26 DIAGNOSIS — M25.511 RIGHT SHOULDER PAIN, UNSPECIFIED CHRONICITY: ICD-10-CM

## 2018-03-26 DIAGNOSIS — M25.512 BILATERAL SHOULDER PAIN, UNSPECIFIED CHRONICITY: Primary | ICD-10-CM

## 2018-03-26 PROCEDURE — 97110 THERAPEUTIC EXERCISES: CPT

## 2018-03-26 PROCEDURE — 97112 NEUROMUSCULAR REEDUCATION: CPT

## 2018-03-26 PROCEDURE — 97140 MANUAL THERAPY 1/> REGIONS: CPT

## 2018-03-26 NOTE — PROGRESS NOTES
Daily Note     Today's date: 3/26/2018  Patient name: Jesús Correa  : 1995  MRN: [id number]  Referring provider: ALVARO Shannon  Dx:   Encounter Diagnosis     ICD-10-CM    1  Bilateral shoulder pain, unspecified chronicity M25 511     M25 512    2  Right shoulder pain, unspecified chronicity M25 511    3  Chronic right shoulder pain M25 511     G89 29                   Subjective: Patient denies any shoulder pain prior to session today stating "feels okay today " She denies any complaints after previous session         Objective: See treatment diary below  Precautions: Anxiety, back pain, depression, headaches, sleep dysfunction,      Daily Treatment Diary      Manual  3/22  2/5  2/12 2/19  2/22  2/26  3/1  3/8  3/12  3/19   Bilateral shoulder ROM  10 min  15 min 12 min 12 min  12 min  13 min  12 min 12 min  12 min  15  min                                                                                                         Exercise Diary  3/22 3/26  2/12 2/19  2/22  2/26  3/1  3/8  3/12  3/19   Upper trapezius stretch      :05x 10 ea :05x10 ea  :05x10 ea  :05x10 ea          Levator stretch    :05x10 ea :05x10 ea  :05x10 ea  :05x10 ea           Shoulder isometrics- all planes    :05x10 ea :05x10 ea               Scapular retraction    2x10 2x10  3x10  3x10 3x10  3x10       Table slides    2x10 NP               Biceps curls    3# 2x10 3# 2x10  3# 3x10  3# 3# 3x10  3# 3x10    4#  3x10   Chin tucks    Supine 2x10  Supine 2x10  supine 2x10  2x10           TB rows  GTB 3x10  GTB 3x10  NV  NV  peach 2x10  orange 3x10 OTB 3x10  OTB 3x10  OTB 3x10  GTB  3x10   TB LPD   GTB 3x10  GTB 3x10  NV  NV  peach 2x10  orange 3x10 OTB 3x10  OTB 3x10  OTB 3x10  OTB  3x10   sidelying ER   1# 3x15 2# 2x10  2x10 ea  1# 2x8  1# 3x10  1# 3x10  1# 3x10 1# 3x10  1# 3x12  1#  3x12   Pulleys 5 min  5 min 3 min 3 min  5min  5 min 5 min 5 min  5 min  5 min   Sidelying flexion/ abd  1# 2x12  2# 2x10 2x10 ea 2x12 ea  3x10  3x12 3x12  2x10  3x12 2x10  1# 2x10  1#  2X10   Scap punches       1# 2x10  1# 2x10  1# 2x10 1# 3x10         Alternating shoulder flexion to 90  20 ea  20 ea 1#            10 ea  10 ea  10 ea   Prone rows  2# 3x10  2# 3x15           1# 2x10 ea  1# 3x10  1#  3x10    Prone I, T, Y  I,T 3x10  2#, 1# 3x10              I only 2x10  I   Only  2x10    scap retraction ER    GTB 2x10                                                                                               Assessment: Tolerated treatment well  Patient demonstrated fatigue post treatment, exhibited good technique with therapeutic exercises and would benefit from continued PT Trialed increased weight with most TE and scap retraction ER where patient tolerated well, no discomfort noted just reports of muscle fatigue  Plan: Continue per plan of care  Progress treatment as tolerated

## 2018-03-27 ENCOUNTER — APPOINTMENT (OUTPATIENT)
Dept: PHYSICAL THERAPY | Facility: REHABILITATION | Age: 23
End: 2018-03-27
Payer: COMMERCIAL

## 2018-03-29 ENCOUNTER — OFFICE VISIT (OUTPATIENT)
Dept: PHYSICAL THERAPY | Facility: REHABILITATION | Age: 23
End: 2018-03-29
Payer: COMMERCIAL

## 2018-03-29 ENCOUNTER — OFFICE VISIT (OUTPATIENT)
Dept: SLEEP CENTER | Facility: CLINIC | Age: 23
End: 2018-03-29
Payer: COMMERCIAL

## 2018-03-29 VITALS
DIASTOLIC BLOOD PRESSURE: 60 MMHG | SYSTOLIC BLOOD PRESSURE: 130 MMHG | HEART RATE: 72 BPM | BODY MASS INDEX: 23.48 KG/M2 | WEIGHT: 127.6 LBS | HEIGHT: 62 IN

## 2018-03-29 DIAGNOSIS — M25.512 BILATERAL SHOULDER PAIN, UNSPECIFIED CHRONICITY: Primary | ICD-10-CM

## 2018-03-29 DIAGNOSIS — M25.511 CHRONIC RIGHT SHOULDER PAIN: ICD-10-CM

## 2018-03-29 DIAGNOSIS — M25.511 BILATERAL SHOULDER PAIN, UNSPECIFIED CHRONICITY: Primary | ICD-10-CM

## 2018-03-29 DIAGNOSIS — G89.29 CHRONIC RIGHT SHOULDER PAIN: ICD-10-CM

## 2018-03-29 DIAGNOSIS — M25.511 RIGHT SHOULDER PAIN, UNSPECIFIED CHRONICITY: ICD-10-CM

## 2018-03-29 DIAGNOSIS — G47.33 OSA (OBSTRUCTIVE SLEEP APNEA): Primary | ICD-10-CM

## 2018-03-29 PROCEDURE — G8985 CARRY GOAL STATUS: HCPCS | Performed by: PHYSICAL THERAPIST

## 2018-03-29 PROCEDURE — 97110 THERAPEUTIC EXERCISES: CPT | Performed by: PHYSICAL THERAPIST

## 2018-03-29 PROCEDURE — 97140 MANUAL THERAPY 1/> REGIONS: CPT | Performed by: PHYSICAL THERAPIST

## 2018-03-29 PROCEDURE — G8984 CARRY CURRENT STATUS: HCPCS | Performed by: PHYSICAL THERAPIST

## 2018-03-29 PROCEDURE — 99214 OFFICE O/P EST MOD 30 MIN: CPT | Performed by: INTERNAL MEDICINE

## 2018-03-29 NOTE — PROGRESS NOTES
Progress Note - [facility] :1995 MRN: [id number]      Reason for Visit:    26 y  o female with type 1 narcolepsy    Assessment:  She has had side effects with the Xyrem, which seems to make her more anxious  She is currently on a number of medications for her anxiety including buspirone, alprazolam and duloxetine  She feels that it dosage of 3 25 g twice nightly works best   She has been rationing her medication because her insurance has not been covering it  She takes the medication only once a night  She has been substituting with tetrahydrocannabinol  Plan:  Renew Xyrem 3 25 g twice nightly  This seems to be effective for both her sleepiness and cataplexy  Follow up:  6 months    History of Present Illness: The patient has type 1 narcolepsy which is well controlled on Xyrem, but due to the side effects and cost, she has been using it less frequently  She does experience occasional cataplexy with buckling of her knees while laughing        Historical Information    Past Medical History:   Past Medical History:   Diagnosis Date    Anxiety     Asthma 2009    Depression     last assessed 7/24/15    Disease of thyroid gland     History of behavior problem     Narcolepsy     Pneumonia     Reactive airway disease 2009         Past Surgical History:   Past Surgical History:   Procedure Laterality Date    DENTAL SURGERY      TYMPANOSTOMY TUBE PLACEMENT Bilateral     last assessed 7/24/15         Social History - see chart  History   Alcohol use: Not on file     History   Drug Use Not on file     History   Smoking Status    Not on file   Smokeless Tobacco    Not on file     Family History:   Family History   Problem Relation Age of Onset    Colon cancer Mother     Ovarian cancer Mother     No Known Problems Father     Allergies Family     Diabetes Family     Uterine cancer Family        Medications/Allergies:      Current Outpatient Prescriptions:    ALPRAZolam (XANAX) 0 5 mg tablet, Take 1 tablet by mouth daily at bedtime as needed, Disp: , Rfl:     busPIRone (BUSPAR) 10 mg tablet, Take 1 tablet by mouth 2 (two) times a day, Disp: , Rfl:     DULoxetine (CYMBALTA) 60 mg delayed release capsule, Take by mouth, Disp: , Rfl:     EPINEPHrine (EPIPEN 2-YURIY) 0 3 mg/0 3 mL SOAJ, Inject 0 3 mL as directed, Disp: , Rfl:     Multiple Vitamin (MULTIVITAMINS PO), Take 1 tablet by mouth daily, Disp: , Rfl:     Sodium Oxybate (XYREM) 500 MG/ML SOLN, Take by mouth, Disp: , Rfl:     amoxicillin (AMOXIL) 500 mg capsule, Take 500 mg by mouth every 8 (eight) hours, Disp: , Rfl:     meloxicam (MOBIC) 15 mg tablet, Take 1 tablet by mouth daily as needed, Disp: , Rfl:     naproxen (NAPROSYN) 500 mg tablet, Take 1 tablet by mouth 2 (two) times a day with meals, Disp: 30 tablet, Rfl: 0    Review of Systems      Genitourinary frequent urination, frequent urination at night, difficulity emptying your bladder and hot flashes   Cardiology lightheadedness   Gastrointestinal heartburn/acid reflux   Neurology headaches, muscle weakness, forgetfulness and decreased concentration   Constitutional weight change of 10 or more pounds in the past year loss of 65 pounds   Integumentary easy brusing   Psychiatry anxiety and depression   Musculoskeletal joint pain, muscle tenderness/aching, back pain, muscle twitching and sciatica   Pulmonary none   ENT decreased hearing   Endocrine intolerance of heat   Hematological none         Objective    Vital Signs:   Vitals:    03/29/18 1500   BP: 130/60   Pulse: 72   Weight: 57 9 kg (127 lb 9 6 oz)   Height: 5' 2" (1 575 m)     [place] Sleepiness Scale: Total score: 9    Physical Exam:    General: Alert, appropriate, cooperative, overweight    Head: NC/AT    Skin: Warm, dry    Neuro: No motor abnormalities, cranial nerves appear intact    Psych: Normal affect      Counseling / Coordination of Care  Total clinic time spent today 15 minutes   Greater than 50% of total time was spent with the patient and / or family counseling and / or coordination of care  A description of the counseling / coordination of care: treatment was discussed    RAMON Fonseca    Board Certified Sleep Specialist

## 2018-03-29 NOTE — PROGRESS NOTES
PT Re-Evaluation     Today's date: 3/29/2018  Patient name: Nan Martin  : 1995  MRN: [id number]  Referring provider: ALVARO Knowles  Dx:   Encounter Diagnosis     ICD-10-CM    1  Bilateral shoulder pain, unspecified chronicity M25 511     M25 512    2  Right shoulder pain, unspecified chronicity M25 511    3  Chronic right shoulder pain M25 511     G89 [id number]                   Assessment  Impairments: abnormal or restricted ROM, activity intolerance, impaired physical strength and pain with function  Functional limitations: Patient reports to evaluation with Chronic right shoulder pain  (primary encounter diagnosis)Pain in right shoulder with the following functional impairments: decreased ability of reaching, lifting, donning and doffing clothing, and work duties  Patient is not irritable  Assessment details: Nan Martin is a 23y o  year old female who presents to [facility] with:   Chronic right shoulder pain  (primary encounter diagnosis)  Pain in right shoulder    [place] presented with the impairments as listed above when beginning OP Physical Therapy  [facility] has made the following improvements since beginning PT: decreased pain, increased ROM, strength, and tolerance to activities   [place] continues to present with the following deficits: pain, decreased ROM, strength, and tolerance to activities  Patient would continue to benefit from skilled PT to address these deficits and to maximize function  Understanding of Dx/Px/POC: good   Prognosis: good  Prognosis details: Patient's prognosis may be affected by the following: anxiety, depression, bladder retention, narcolepsy, back pain    Goals  Short-term goals:   1  Patient's pain will be decreased to 3/10 within 4 weeks- Met  2  Patient's strength will increase to 4-/5 within 4 weeks - Met    Long-term goals:   1  Patient will be able to perform lift bilateral UE with minimal to no pain  - Partially Met  2   Patient will be able to perform perform IADL with minimal to no pain  - Partially Met  3  Patient will be independent in HEP - Partially Met    Plan  Patient would benefit from: skilled PT  Planned modality interventions: electrical stimulation/Russian stimulation and cryotherapy  Planned therapy interventions: home exercise program, manual therapy, patient education, postural training, strengthening, stretching, therapeutic activities, therapeutic exercise, joint mobilization and IADL retraining  Frequency: 2x week  Duration in visits: 8  Duration in weeks: 4  Treatment plan discussed with: patient and PTA  Plan details: Thank you for referring this patient to Physical Therapy at [place] [address] and for the opportunity to coordinate care  Subjective Evaluation    History of Present Illness  Mechanism of injury: Patient has been seen for total of 13 visits for OP PT for bilateral shoulder pain  Patient rates overall improvement since beginning PT 85%  Patient reports improvements with lifting, reaching, overhead activity  Patient reports most difficulty with  repetitive shoulder movements while at work including chopping and using salad spinner or prolonged overhead activity  She reports "this therapy has been so so helpful   I can tell it's so much better, it doesn't bother me when I'm sleeping, it's not constantly aching "     Quality of life: fair    Pain  Current pain ratin  At best pain ratin  At worst pain rating: 3  Quality: dull ache  Aggravating factors: overhead activity and lifting    Treatments  Previous treatment: physical therapy  Current treatment: physical therapy  Patient Goals  Patient goals for therapy: decreased pain, increased motion, increased strength and independence with ADLs/IADLs          Objective     Postural Observations  Seated posture: fair  Standing posture: fair    Additional Postural Observation Details  Patient demonstrates increased thoracic kyphosis, cervical protrusion, and rounded shoulders Palpation   Left   Hypertonic in the upper trapezius  Right   Hypertonic in the upper trapezius  Tenderness     Left Shoulder   Tenderness in the acromion and clavicle  Right Shoulder  Tenderness in the acromion and clavicle  Cervical/Thoracic Screen   Cervical range of motion within normal limits  Cervical range of motion within normal limits with the following exceptions: Cervical flexion: 50 degrees  Cervical extension: 40 degrees  R lateral flexion: 25 degrees  L lateral flexion: 25 degrees  R lateral rotation: 65 degrees  L lateral rotation: 65 degrees    Thoracic range of motion within normal limits  Thoracic range of motion within normal limits with the following exceptions: 50% bilateral thoracic rotation with pain reported bilaterally       Neurological Testing     Sensation     Shoulder   Left Shoulder   Intact: light touch    Right Shoulder   Intact: light touch    Additional Neurological Details  Patient does report "numbness" along upper lip and bilateral C8 dermatomes along bilateral forearms reporting "I think that's because of anxiety "     Active Range of Motion   Left Shoulder   Flexion: 165 degrees   Abduction: 165 degrees     Right Shoulder   Flexion: 165 degrees   Abduction: 165 degrees     Passive Range of Motion   Left Shoulder   Flexion: 170 degrees   Abduction: 170 degrees   External rotation 90°: 80 degrees   Internal rotation 90°: 60 degrees     Right Shoulder   Flexion: 170 degrees   Abduction: 170 degrees   External rotation 90°: 80 degrees   Internal rotation 90°: 60 degrees     Strength/Myotome Testing     Left Shoulder     Planes of Motion   Flexion: 4   Abduction: 4   External rotation at 0°: 4   Internal rotation at 0°: 4     Right Shoulder     Planes of Motion   Flexion: 4   Abduction: 4   External rotation at 0°: 4   Internal rotation at 0°: 4     General Comments     Shoulder Comments   CONSTITUTIONAL: No fever, no chills, no malaise, no recent weight loss or gain   EYES: No complaints of vision changes, no red eyes, no diplopia   ENT: no loss of hearing, no tinnitus, no nosebleeds, no sore throat, no dysphasia, no dysphagia  CARDIOVASCULAR: no complaints of chest pain, no palpitations, no LE edema  RESPIRATORY: no complaints of SOB, no wheezing, no cough  GASTROINTESTINAL: no reports of abdominal pain, no constipation, no diarrhea, no vomiting, no bloody stools, no other changes in digestion, no loss of control of bowel  GENITOURINARY: no reports of dysuria, no incontinence, no loss of control of bladder; patient has bladder retention  INTEGUMENTARY: no complaints of skin rash or irritation, no bleeding or drainage   NEUROLOGICAL:  DTR, myotomes, and dermatomes performed in neurological section; patient has narcolepsy  ALLERGIES: bananas, apples, pumpkin, bees  SURGERIES: bilateral tubes in ears, teeth removal  MEDICATIONS: Meloxicam             Precautions: Anxiety, back pain, depression, headaches, sleep dysfunction,      Daily Treatment Diary      Manual  3/22  3/29  2/12 2/19  2/22  2/26  3/1  3/8  3/12  3/19   Bilateral shoulder ROM  10 min  10 min 12 min 12 min  12 min  13 min  12 min 12 min  12 min  15  min                                                                                                         Exercise Diary  3/22 3/26  3/29 2/19  2/22  2/26  3/1  3/8  3/12  3/19   Upper trapezius stretch       :05x10 ea  :05x10 ea  :05x10 ea          Levator stretch     :05x10 ea  :05x10 ea  :05x10 ea           Shoulder isometrics- all planes     :05x10 ea               Scapular retraction    2x10 2x10  3x10  3x10 3x10  3x10       Table slides     NP               Biceps curls     3# 2x10  3# 3x10  3# 3# 3x10  3# 3x10    4#  3x10   Chin tucks     Supine 2x10  supine 2x10  2x10           TB rows  GTB 3x10  GTB 3x10  GTB 3x10  NV  peach 2x10  orange 3x10 OTB 3x10  OTB 3x10  OTB 3x10  GTB  3x10   TB LPD   GTB 3x10  GTB 3x10  GTB 3x10  NV  peach 2x10  orange 3x10 OTB 3x10  OTB 3x10  OTB 3x10  OTB  3x10   sidelying ER   1# 3x15 2# 2x10  1# 3x10 ea  1# 2x8  1# 3x10  1# 3x10  1# 3x10 1# 3x10  1# 3x12  1#  3x12   Pulleys 5 min  5 min 3 min 3 min  5min  5 min 5 min 5 min  5 min  5 min   Sidelying flexion/ abd  1# 2x12  2# 2x10 1# 3x10 ea 2x12 ea  3x10  3x12 3x12  2x10  3x12 2x10  1# 2x10  1#  2X10   Scap punches       1# 2x10  1# 2x10  1# 2x10 1# 3x10         Alternating shoulder flexion to 90  20 ea  20 ea 1# 20 ea          10 ea  10 ea  10 ea   Prone rows  2# 3x10  2# 3x15 2# 3x10 1# 3x10       1# 2x10 ea  1# 3x10  1#  3x10    Prone I, T, Y  I,T 3x10  2#, 1# 3x10 I, T 3x10           I only 2x10  I   Only  2x10    scap retraction ER    GTB 2x10 NP today

## 2018-03-30 ENCOUNTER — DOCUMENTATION (OUTPATIENT)
Dept: SLEEP CENTER | Facility: CLINIC | Age: 23
End: 2018-03-30

## 2018-04-02 ENCOUNTER — OFFICE VISIT (OUTPATIENT)
Dept: PHYSICAL THERAPY | Facility: REHABILITATION | Age: 23
End: 2018-04-02
Payer: COMMERCIAL

## 2018-04-02 DIAGNOSIS — M25.512 BILATERAL SHOULDER PAIN, UNSPECIFIED CHRONICITY: Primary | ICD-10-CM

## 2018-04-02 DIAGNOSIS — G89.29 CHRONIC RIGHT SHOULDER PAIN: ICD-10-CM

## 2018-04-02 DIAGNOSIS — M25.511 BILATERAL SHOULDER PAIN, UNSPECIFIED CHRONICITY: Primary | ICD-10-CM

## 2018-04-02 DIAGNOSIS — M25.511 RIGHT SHOULDER PAIN, UNSPECIFIED CHRONICITY: ICD-10-CM

## 2018-04-02 DIAGNOSIS — M25.511 CHRONIC RIGHT SHOULDER PAIN: ICD-10-CM

## 2018-04-02 PROCEDURE — 97140 MANUAL THERAPY 1/> REGIONS: CPT

## 2018-04-02 PROCEDURE — 97110 THERAPEUTIC EXERCISES: CPT

## 2018-04-02 PROCEDURE — 97112 NEUROMUSCULAR REEDUCATION: CPT

## 2018-04-02 NOTE — PROGRESS NOTES
Daily Note     Today's date: 2018  Patient name: Ghassan Man  : 1995  MRN: [id number]  Referring provider: ALVARO Jones  Dx:   Encounter Diagnosis     ICD-10-CM    1  Bilateral shoulder pain, unspecified chronicity M25 511     M25 512    2  Right shoulder pain, unspecified chronicity M25 511    3  Chronic right shoulder pain M25 511     G89 29                   Subjective: Patient reports right shoulder soreness prior to therapy more than left and reports less soreness after previous session         Objective: See treatment diary below  Precautions: Anxiety, back pain, depression, headaches, sleep dysfunction,      Daily Treatment Diary      Manual  3/22  3/29 4/2 2/19  2/22  2/26  3/1  3/8  3/12  3/19   Bilateral shoulder ROM  10 min  10 min 10 min 12 min  12 min  13 min  12 min 12 min  12 min  15  min                                                                                                         Exercise Diary  3/22 3/26  3/29 4/2  2/22  2/26  3/1  3/8  3/12  3/19   Upper trapezius stretch          :05x10 ea          Levator stretch        :05x10 ea           Shoulder isometrics- all planes                    Scapular retraction    2x10    3x10 3x10  3x10       Table slides                    Biceps curls        3# 3# 3x10  3# 3x10    4#  3x10   Chin tucks        2x10           TB rows  GTB 3x10  GTB 3x10  GTB 3x10 GTB 3x10   orange 3x10 OTB 3x10  OTB 3x10  OTB 3x10  GTB  3x10   TB LPD   GTB 3x10  GTB 3x10  GTB 3x10 GTB 3x10   orange 3x10 OTB 3x10  OTB 3x10  OTB 3x10  OTB  3x10   sidelying ER   1# 3x15 2# 2x10  1# 3x10 ea 1# 3x10   1# 3x10  1# 3x10 1# 3x10  1# 3x12  1#  3x12   Pulleys 5 min  5 min 3 min 5 min   5 min 5 min 5 min  5 min  5 min   Sidelying flexion/ abd  1# 2x12  2# 2x10 1# 3x10 ea 1#3x10   3x12 3x12  2x10  3x12 2x10  1# 2x10  1#  2X10   Scap punches          1# 2x10 1# 3x10         Alternating shoulder flexion to 90  20 ea  20 ea 1# 20 ea  20 ea        10 ea  10 ea  10 ea   Prone rows  2# 3x10  2# 3x15 2# 3x10 2# 3x10       1# 2x10 ea  1# 3x10  1#  3x10    Prone I, T, Y  I,T 3x10  2#, 1# 3x10 I, T 3x10 I, T 3x12          I only 2x10  I   Only  2x10    scap retraction ER    GTB 2x10 NP today                                                                                               Assessment: Tolerated treatment well  Patient demonstrated fatigue post treatment, exhibited good technique with therapeutic exercises and would benefit from continued PT Patient reporting most fatigue with sidelying TE however denies any increased shoulder discomfort with any TE performed  Plan: Continue per plan of care  Progress treatment as tolerated

## 2018-04-05 ENCOUNTER — APPOINTMENT (OUTPATIENT)
Dept: PHYSICAL THERAPY | Facility: REHABILITATION | Age: 23
End: 2018-04-05
Payer: COMMERCIAL

## 2018-04-09 ENCOUNTER — OFFICE VISIT (OUTPATIENT)
Dept: PHYSICAL THERAPY | Facility: REHABILITATION | Age: 23
End: 2018-04-09
Payer: COMMERCIAL

## 2018-04-09 DIAGNOSIS — M25.512 BILATERAL SHOULDER PAIN, UNSPECIFIED CHRONICITY: Primary | ICD-10-CM

## 2018-04-09 DIAGNOSIS — G89.29 CHRONIC RIGHT SHOULDER PAIN: ICD-10-CM

## 2018-04-09 DIAGNOSIS — M25.511 CHRONIC RIGHT SHOULDER PAIN: ICD-10-CM

## 2018-04-09 DIAGNOSIS — M25.511 RIGHT SHOULDER PAIN, UNSPECIFIED CHRONICITY: ICD-10-CM

## 2018-04-09 DIAGNOSIS — M25.511 BILATERAL SHOULDER PAIN, UNSPECIFIED CHRONICITY: Primary | ICD-10-CM

## 2018-04-09 PROCEDURE — 97140 MANUAL THERAPY 1/> REGIONS: CPT

## 2018-04-09 PROCEDURE — 97110 THERAPEUTIC EXERCISES: CPT

## 2018-04-09 PROCEDURE — 97112 NEUROMUSCULAR REEDUCATION: CPT

## 2018-04-09 NOTE — PROGRESS NOTES
Daily Note     Today's date: 2018  Patient name: Elkin Sahni  : 1995  MRN: [id number]  Referring provider: Severiano Capron, CR*  Dx:   Encounter Diagnosis     ICD-10-CM    1  Bilateral shoulder pain, unspecified chronicity M25 511     M25 512    2  Right shoulder pain, unspecified chronicity M25 511    3   Chronic right shoulder pain M25 511     G89 29                   Subjective: Patient denies any shoulder pain prior to session today stating "shoulders are good, backs not, think its sciatic nerve or something "       Objective: See treatment diary below     Precautions: Anxiety, back pain, depression, headaches, sleep dysfunction,      Daily Treatment Diary      Manual  3/22  3/29 4/2 4/9  2/22  2/26  3/1  3/8  3/12  3/19   Bilateral shoulder ROM  10 min  10 min 10 min 10 min  12 min  13 min  12 min 12 min  12 min  15  min                                                                                                         Exercise Diary  3/22 3/26  3/29 4/2  4/9  2/26  3/1  3/8  3/12  3/19   Upper trapezius stretch          :05x10 ea          Levator stretch        :05x10 ea           Shoulder isometrics- all planes                    Scapular retraction    2x10    3x10 3x10  3x10       Table slides                    Biceps curls        3# 3# 3x10  3# 3x10    4#  3x10   Chin tucks        2x10           TB rows  GTB 3x10  GTB 3x10  GTB 3x10 GTB 3x10 GTB 3x10  orange 3x10 OTB 3x10  OTB 3x10  OTB 3x10  GTB  3x10   TB LPD   GTB 3x10  GTB 3x10  GTB 3x10 GTB 3x10 GTB 3x10  orange 3x10 OTB 3x10  OTB 3x10  OTB 3x10  OTB  3x10   sidelying ER   1# 3x15 2# 2x10  1# 3x10 ea 1# 3x10 1# 3x12  1# 3x10  1# 3x10 1# 3x10  1# 3x12  1#  3x12   Pulleys 5 min  5 min 3 min 5 min 5 min  5 min 5 min 5 min  5 min  5 min   Sidelying flexion/ abd  1# 2x12  2# 2x10 1# 3x10 ea 1#3x10 1# 3x12  3x12 3x12  2x10  3x12 2x10  1# 2x10  1#  2X10   Scap punches          1# 2x10 1# 3x10         Alternating shoulder flexion to 90  20 ea  20 ea 1# 20 ea  20 ea  30 ea      10 ea  10 ea  10 ea   Prone rows  2# 3x10  2# 3x15 2# 3x10 2# 3x10  2# 3x12     1# 2x10 ea  1# 3x10  1#  3x10    Prone I, T, Y  I,T 3x10  2#, 1# 3x10 I, T 3x10 I, T 3x12  I T 1# 3x10 ea        I only 2x10  I   Only  2x10    scap retraction ER    GTB 2x10 NP today                                                                                           Assessment: Tolerated treatment well  Patient demonstrated fatigue post treatment, exhibited good technique with therapeutic exercises and would benefit from continued PT Trialed increased weight with certain TE where patient tolerated well, no discomfort noted with any performed just reports of muscle fatigue  Patient given updated HEP this session with patient reporting understanding  Plan: Continue per plan of care  Progress treatment as tolerated

## 2018-04-12 ENCOUNTER — APPOINTMENT (OUTPATIENT)
Dept: PHYSICAL THERAPY | Facility: REHABILITATION | Age: 23
End: 2018-04-12
Payer: COMMERCIAL

## 2018-04-16 ENCOUNTER — OFFICE VISIT (OUTPATIENT)
Dept: PHYSICAL THERAPY | Facility: REHABILITATION | Age: 23
End: 2018-04-16
Payer: COMMERCIAL

## 2018-04-16 DIAGNOSIS — M25.511 RIGHT SHOULDER PAIN, UNSPECIFIED CHRONICITY: ICD-10-CM

## 2018-04-16 DIAGNOSIS — G89.29 CHRONIC RIGHT SHOULDER PAIN: ICD-10-CM

## 2018-04-16 DIAGNOSIS — M25.511 BILATERAL SHOULDER PAIN, UNSPECIFIED CHRONICITY: Primary | ICD-10-CM

## 2018-04-16 DIAGNOSIS — M25.511 CHRONIC RIGHT SHOULDER PAIN: ICD-10-CM

## 2018-04-16 DIAGNOSIS — M25.512 BILATERAL SHOULDER PAIN, UNSPECIFIED CHRONICITY: Primary | ICD-10-CM

## 2018-04-16 PROCEDURE — 97140 MANUAL THERAPY 1/> REGIONS: CPT | Performed by: PHYSICAL THERAPIST

## 2018-04-16 PROCEDURE — G8985 CARRY GOAL STATUS: HCPCS | Performed by: PHYSICAL THERAPIST

## 2018-04-16 PROCEDURE — 97110 THERAPEUTIC EXERCISES: CPT | Performed by: PHYSICAL THERAPIST

## 2018-04-16 PROCEDURE — 97530 THERAPEUTIC ACTIVITIES: CPT | Performed by: PHYSICAL THERAPIST

## 2018-04-16 PROCEDURE — G8984 CARRY CURRENT STATUS: HCPCS | Performed by: PHYSICAL THERAPIST

## 2018-04-16 NOTE — PROGRESS NOTES
PT Re-Evaluation     Today's date: 2018  Patient name: Miriam Atkins  : 1995  MRN: [id number]  Referring provider: ALVARO Howard  Dx:   Encounter Diagnosis     ICD-10-CM    1  Bilateral shoulder pain, unspecified chronicity M25 511     M25 512    2  Right shoulder pain, unspecified chronicity M25 511    3  Chronic right shoulder pain M25 511     G89 29                   Assessment  Impairments: activity intolerance, impaired physical strength, lacks appropriate home exercise program and pain with function  Functional limitations: Patient reports to evaluation with Chronic right shoulder pain  (primary encounter diagnosis)Pain in right shoulder with the following functional impairments: decreased ability of reaching, lifting, donning and doffing clothing, and work duties  Patient is not irritable  Assessment details: Miriam Atkins is a 23y o  year old female who presents to IE with:   Chronic right shoulder pain  (primary encounter diagnosis)  Pain in right shoulder     Brannon Friend has made the following improvements since beginning PT: decreased pain, increased ROM, increased strength and increased tolerance to activities    Brannon Friend continues to present with the impairments as listed above  Patient would continue to benefit from skilled PT to address these deficits and to maximize function  Understanding of Dx/Px/POC: good   Prognosis: good  Prognosis details: Patient's prognosis may be affected by the following: anxiety, depression, bladder retention, narcolepsy, back pain    Goals  Short-term goals:   1  Patient's pain will be decreased to less than 3/10 within 4 weeks- Met  2  Patient's strength will increase to 4+/5 within 4 weeks - Partially Met    Long-term goals:   1  Patient will be able to perform lift bilateral UE with minimal to no pain  - Partially Met  2  Patient will be able to perform perform IADL with minimal to no pain  - Partially Met  3   Patient will be independent in HEP - Partially Met    Plan  Patient would benefit from: skilled PT  Planned modality interventions: electrical stimulation/Russian stimulation and cryotherapy  Planned therapy interventions: home exercise program, manual therapy, patient education, postural training, strengthening, stretching, therapeutic activities, therapeutic exercise, joint mobilization and IADL retraining  Frequency: 1x week  Duration in visits: 4  Duration in weeks: 4  Treatment plan discussed with: patient and PTA  Plan details: Thank you for referring Jose Grajeda to Physical Therapy at [place] [address] and for the opportunity to coordinate care  Subjective Evaluation    History of Present Illness  Mechanism of injury: Patient has been seen for total of 16 visits for OP PT for bilateral shoulder pain  Patient rates overall improvement since beginning PT 85%  Patient reports improvements with lifting, reaching, overhead activity, and work duties including chopping and using salad spinner  Patient reports most difficulty with  repetitive shoulder movements including heavy lifting and lifting overhead  She reports "I can tell it's so much better, I just feel like the strength still isn't quite there yet "     Quality of life: fair    Pain  Current pain ratin  At best pain ratin  At worst pain rating: 3  Quality: dull ache  Aggravating factors: overhead activity and lifting    Treatments  Previous treatment: physical therapy  Current treatment: physical therapy  Patient Goals  Patient goals for therapy: decreased pain, increased motion, increased strength and independence with ADLs/IADLs          Objective     Postural Observations  Seated posture: fair  Standing posture: fair    Additional Postural Observation Details  Patient demonstrates increased thoracic kyphosis, cervical protrusion, and rounded shoulders     Palpation   Left   Hypertonic in the upper trapezius  Right   Hypertonic in the upper trapezius  Cervical/Thoracic Screen   Cervical range of motion within normal limits  Cervical range of motion within normal limits with the following exceptions: Cervical flexion: 50 degrees  Cervical extension: 40 degrees  R lateral flexion: 25 degrees  L lateral flexion: 25 degrees  R lateral rotation: 65 degrees  L lateral rotation: 65 degrees    Thoracic range of motion within normal limits  Thoracic range of motion within normal limits with the following exceptions: 50% bilateral thoracic rotation with pain reported bilaterally       Neurological Testing     Sensation     Shoulder   Left Shoulder   Intact: light touch    Right Shoulder   Intact: light touch    Active Range of Motion   Left Shoulder   Flexion: 165 degrees   Abduction: 165 degrees     Right Shoulder   Flexion: 165 degrees   Abduction: 165 degrees     Passive Range of Motion   Left Shoulder   Flexion: 170 degrees   Abduction: 170 degrees   External rotation 90°: 90 degrees   Internal rotation 90°: 70 degrees     Right Shoulder   Flexion: 170 degrees   Abduction: 170 degrees   External rotation 90°: 90 degrees   Internal rotation 90°: 70 degrees     Strength/Myotome Testing     Left Shoulder     Planes of Motion   Flexion: 4+   Abduction: 4   External rotation at 0°: 4   Internal rotation at 0°: 4     Right Shoulder     Planes of Motion   Flexion: 4+   Abduction: 4   External rotation at 0°: 4   Internal rotation at 0°: 4     General Comments     Shoulder Comments   CONSTITUTIONAL: No fever, no chills, no malaise, no recent weight loss or gain   EYES: No complaints of vision changes, no red eyes, no diplopia   ENT: no loss of hearing, no tinnitus, no nosebleeds, no sore throat, no dysphasia, no dysphagia  CARDIOVASCULAR: no complaints of chest pain, no palpitations, no LE edema  RESPIRATORY: no complaints of SOB, no wheezing, no cough  GASTROINTESTINAL: no reports of abdominal pain, no constipation, no diarrhea, no vomiting, no bloody stools, no other changes in digestion, no loss of control of bowel  GENITOURINARY: no reports of dysuria, no incontinence, no loss of control of bladder; patient has bladder retention  INTEGUMENTARY: no complaints of skin rash or irritation, no bleeding or drainage   NEUROLOGICAL:  DTR, myotomes, and dermatomes performed in neurological section; patient has narcolepsy  ALLERGIES: bananas, apples, pumpkin, bees  SURGERIES: bilateral tubes in ears, teeth removal  MEDICATIONS: Meloxicam             Precautions: Anxiety, back pain, depression, headaches, sleep dysfunction,      Daily Treatment Diary      Manual  3/22  3/29 4/2 4/9  4/16  2/26  3/1  3/8  3/12  3/19   Bilateral shoulder ROM  10 min  10 min 10 min 10 min  12 min  13 min  12 min 12 min  12 min  15  min                                                                                                         Exercise Diary  3/22 3/26  3/29 4/2  4/9  4/16   3/8  3/12  3/19   Table slides                   Biceps curls       6# with press   3# 3x10    4#  3x10   TB rows  GTB 3x10  GTB 3x10  GTB 3x10 GTB 3x10 GTB 3x10 GTB 3x10   OTB 3x10  OTB 3x10  GTB  3x10   TB LPD   GTB 3x10  GTB 3x10  GTB 3x10 GTB 3x10 GTB 3x10 GTB 3x10   OTB 3x10  OTB 3x10  OTB  3x10   sidelying ER   1# 3x15 2# 2x10  1# 3x10 ea 1# 3x10 1# 3x12 2# 2x8  1# 3x10  1# 3x12  1#  3x12   Pulleys 5 min  5 min 3 min 5 min 5 min  5 min  5 min  5 min  5 min   Sidelying flexion/ abd  1# 2x12  2# 2x10 1# 3x10 ea 1#3x10 1# 3x12  2# 2x8   3x12 2x10  1# 2x10  1#  2X10   Alternating shoulder flexion to 90  20 ea  20 ea 1# 20 ea  20 ea  30 ea 1# 20 ea flex/  scap   10 ea  10 ea  10 ea   Prone rows  2# 3x10  2# 3x15 2# 3x10 2# 3x10  2# 3x12 2# 3x10  1# 2x10 ea  1# 3x10  1#  3x10    Prone I, T, Y  I,T 3x10  2#, 1# 3x10 I, T 3x10 I, T 3x12  I T 1# 3x10 ea I, T 1# 3x10 ea     I only 2x10  I   Only  2x10    scap retraction ER    GTB 2x10 NP today

## 2018-04-23 ENCOUNTER — OFFICE VISIT (OUTPATIENT)
Dept: PHYSICAL THERAPY | Facility: REHABILITATION | Age: 23
End: 2018-04-23
Payer: COMMERCIAL

## 2018-04-23 DIAGNOSIS — M25.511 CHRONIC RIGHT SHOULDER PAIN: ICD-10-CM

## 2018-04-23 DIAGNOSIS — M25.511 BILATERAL SHOULDER PAIN, UNSPECIFIED CHRONICITY: Primary | ICD-10-CM

## 2018-04-23 DIAGNOSIS — M25.512 BILATERAL SHOULDER PAIN, UNSPECIFIED CHRONICITY: Primary | ICD-10-CM

## 2018-04-23 DIAGNOSIS — M25.511 RIGHT SHOULDER PAIN, UNSPECIFIED CHRONICITY: ICD-10-CM

## 2018-04-23 DIAGNOSIS — G89.29 CHRONIC RIGHT SHOULDER PAIN: ICD-10-CM

## 2018-04-23 PROCEDURE — 97112 NEUROMUSCULAR REEDUCATION: CPT

## 2018-04-23 PROCEDURE — 97110 THERAPEUTIC EXERCISES: CPT

## 2018-04-23 PROCEDURE — 97140 MANUAL THERAPY 1/> REGIONS: CPT

## 2018-04-23 PROCEDURE — 97530 THERAPEUTIC ACTIVITIES: CPT

## 2018-04-23 NOTE — PROGRESS NOTES
Daily Note     Today's date: 2018  Patient name: Clara Sultana  : 1995  MRN: [id number]  Referring provider: ALVARO Guallpa  Dx:   Encounter Diagnosis     ICD-10-CM    1  Bilateral shoulder pain, unspecified chronicity M25 511     M25 512    2  Right shoulder pain, unspecified chronicity M25 511    3  Chronic right shoulder pain M25 511     G89 29                   Subjective: Patient denies any shoulder pain prior to session today and reports moderate soreness after previous session  Patient reports "lifting and putting stuff away at work has gotten much easier " She reports compliance with HEP         Objective: See treatment diary below  Precautions: Anxiety, back pain, depression, headaches, sleep dysfunction,      Daily Treatment Diary      Manual  3/22  3/29 4/2 4/9  4/16 4/23   3/8  3/12  3/19   Bilateral shoulder ROM  10 min  10 min 10 min 10 min  12 min  12 min  12 min  12 min  15  min                                                                                                         Exercise Diary  3/22 3/26  3/29 4/2  4/9  4/16 4/23    3/12  3/19   Table slides                   Biceps curls       6# with press      4#  3x10   TB rows  GTB 3x10  GTB 3x10  GTB 3x10 GTB 3x10 GTB 3x10 GTB 3x10 GTB 3x10   OTB 3x10  GTB  3x10   TB LPD   GTB 3x10  GTB 3x10  GTB 3x10 GTB 3x10 GTB 3x10 GTB 3x10 GTB 3x10   OTB 3x10  OTB  3x10   sidelying ER   1# 3x15 2# 2x10  1# 3x10 ea 1# 3x10 1# 3x12 2# 2x8 2# 2x10   1# 3x12  1#  3x12   Pulleys 5 min  5 min 3 min 5 min 5 min  5 min 5 min   5 min  5 min   Sidelying flexion/ abd  1# 2x12  2# 2x10 1# 3x10 ea 1#3x10 1# 3x12  2# 2x8 2# 2x10   1# 2x10  1#  2X10   Alternating shoulder flexion to 90  20 ea  20 ea 1# 20 ea  20 ea  30 ea 1# 20 ea flex/  scap 1# 30 ea flx/scap    10 ea  10 ea   Prone rows  2# 3x10  2# 3x15 2# 3x10 2# 3x10  2# 3x12 2# 3x10 2# 3x10   1# 3x10  1#  3x10    Prone I, T, Y  I,T 3x10  2#, 1# 3x10 I, T 3x10 I, T 3x12  I T 1# 3x10 ea I, T 1# 3x10 ea I, T 1# 3x12 ea    I only 2x10  I   Only  2x10    scap retraction ER    GTB 2x10 NP today                 ball on wall                                                                        Assessment: Tolerated treatment well  Patient demonstrated fatigue post treatment, exhibited good technique with therapeutic exercises and would benefit from continued PT Trialed ball on wall where patient tolerated well, muscle fatigue noted  Most difficulty noted with alternating shoulder flex/scaption  Plan: Continue per plan of care  Progress treatment as tolerated

## 2018-04-30 ENCOUNTER — OFFICE VISIT (OUTPATIENT)
Dept: PHYSICAL THERAPY | Facility: REHABILITATION | Age: 23
End: 2018-04-30
Payer: COMMERCIAL

## 2018-04-30 DIAGNOSIS — M25.511 CHRONIC RIGHT SHOULDER PAIN: ICD-10-CM

## 2018-04-30 DIAGNOSIS — M25.511 BILATERAL SHOULDER PAIN, UNSPECIFIED CHRONICITY: Primary | ICD-10-CM

## 2018-04-30 DIAGNOSIS — M25.512 BILATERAL SHOULDER PAIN, UNSPECIFIED CHRONICITY: Primary | ICD-10-CM

## 2018-04-30 DIAGNOSIS — G89.29 CHRONIC RIGHT SHOULDER PAIN: ICD-10-CM

## 2018-04-30 DIAGNOSIS — M25.511 RIGHT SHOULDER PAIN, UNSPECIFIED CHRONICITY: ICD-10-CM

## 2018-04-30 PROCEDURE — 97140 MANUAL THERAPY 1/> REGIONS: CPT | Performed by: PHYSICAL THERAPIST

## 2018-04-30 PROCEDURE — 97112 NEUROMUSCULAR REEDUCATION: CPT | Performed by: PHYSICAL THERAPIST

## 2018-04-30 PROCEDURE — 97110 THERAPEUTIC EXERCISES: CPT | Performed by: PHYSICAL THERAPIST

## 2018-04-30 PROCEDURE — G8986 CARRY D/C STATUS: HCPCS | Performed by: PHYSICAL THERAPIST

## 2018-04-30 PROCEDURE — G8985 CARRY GOAL STATUS: HCPCS | Performed by: PHYSICAL THERAPIST

## 2018-04-30 NOTE — PROGRESS NOTES
Daily Note     Today's date: 2018  Patient name: Arsen Hlyton  : 1995  MRN: [id number]  Referring provider: ALVARO Cruz  Dx:   Encounter Diagnosis     ICD-10-CM    1  Bilateral shoulder pain, unspecified chronicity M25 511     M25 512    2  Right shoulder pain, unspecified chronicity M25 511    3   Chronic right shoulder pain M25 511     G89 29                   Subjective: Patti Camejo reports her shoulders "are feeling pretty good " She reports N/T on 18 in bilateral UE noting "it might have just been my anxiety "         Objective: See treatment diary below  Precautions: Anxiety, back pain, depression, headaches, sleep dysfunction,      Daily Treatment Diary      Manual  3/22  3/29 4/2 4/9  4/16 4/23 4/30   3/12  3/19   Bilateral shoulder ROM  10 min  10 min 10 min 10 min  12 min  12 min 12 min   12 min  15  min                                                                                                         Exercise Diary  3/22 3/26  3/29 4/2  4/9  4/16 4/23  4/30   3/19     Biceps curls       6# with press  8# with press   4#  3x10   TB rows  GTB 3x10  GTB 3x10  GTB 3x10 GTB 3x10 GTB 3x10 GTB 3x10 GTB 3x10 GTB 3x10   GTB  3x10   TB LPD   GTB 3x10  GTB 3x10  GTB 3x10 GTB 3x10 GTB 3x10 GTB 3x10 GTB 3x10 GTB 3x10   OTB  3x10   sidelying ER   1# 3x15 2# 2x10  1# 3x10 ea 1# 3x10 1# 3x12 2# 2x8 2# 2x10 2# 2x10   1#  3x12   Pulleys 5 min  5 min 3 min 5 min 5 min  5 min 5 min 5 min   5 min   Sidelying flexion/ abd  1# 2x12  2# 2x10 1# 3x10 ea 1#3x10 1# 3x12  2# 2x8 2# 2x10 2# 2x10   1#  2X10   Alternating shoulder flexion to 90  20 ea  20 ea 1# 20 ea  20 ea  30 ea 1# 20 ea flex/  scap 1# 30 ea flx/scap 1# 30 ea flx/scap   10 ea   Prone rows  2# 3x10  2# 3x15 2# 3x10 2# 3x10  2# 3x12 2# 3x10 2# 3x10 4# 3x10   1#  3x10    Prone I, T, Y  I,T 3x10  2#, 1# 3x10 I, T 3x10 I, T 3x12  I T 1# 3x10 ea I, T 1# 3x10 ea I, T 1# 3x12 ea I, T 1# 3x12 ea   I   Only  2x10    ball on wall- cw/ccw               20 ea                                                       Assessment: Tolerated treatment fair  Patient demonstrated fatigue post treatment, exhibited good technique with therapeutic exercises and would benefit from continued PT  Patient progressed in weight for prone rows and bilateral shoulder press with bicep curls today with fair tolerance  Patient reporting fatigue at this time with sidelying exercises, but notes "not as tiring as it used to be "       Plan: Continue per plan of care  Progress treatment as tolerated

## 2018-05-07 NOTE — PROGRESS NOTES
----- Message from Keerthi Haro DO sent at 5/2/2018  3:26 PM EDT -----  Inform pt US reveals stable fibroid, small ovarian cyst no change, resume annual gyn exam Assessment   1  Right shoulder pain (599 41) (M25 511)   2  Anxiety (300 00) (F41 9)    Plan   Right shoulder pain    · Meloxicam 15 MG Oral Tablet; TAKE 1 TABLET DAILY AS NEEDED   · *1 - [facility]  *  Status: Active  Requested for: [id number]  Care Summary provided  : Yes    Discussion/Summary   Discussion Summary:    Start physical therapy and meloxicam for pain in shoulder  up with Dr Cody Fermin as discussed  Please have them reach out to this office if they have any questions as to why you should be urgently followed up with  for follow up in 1 month  at length with this patient that per Dr Julia Garcia last note, this office is no longer allowed to prescribe any of her psych medications  Also of note, the patient has new insurance, will need new referrals to endocrinology, urology, and allergist once she has appointments  Counseling Documentation With Imm: The patient, patient's family was counseled regarding diagnostic results,-- instructions for management,-- risk factor reductions,-- prognosis,-- patient and family education,-- impressions,-- risks and benefits of treatment options,-- importance of compliance with treatment  total time of encounter was 35 minutes-- and-- 25 minutes was spent counseling  Medication SE Review and Pt Understands Tx: Possible side effects of new medications were reviewed with the patient/guardian today  The treatment plan was reviewed with the patient/guardian  The patient/guardian understands and agrees with the treatment plan      Chief Complaint   Chief Complaint Free Text Note Form: Patient is here today for a follow up visit for anxiety  is asking for a refill of Xanax  refills needed  testing done 11/25/17      History of Present Illness   HPI: Patient presents for a follow up visit   She reports that she had labs done in November that she would like to review the results,     function tests reviewed, she has not been able to follow up with endocrinology yet  presents for a follow up on anxiety  She reports that she has not been able to see her sleep doctor, Dr Redd Tavarez, due to insurance changes  She reports her symptoms are very bad lately related to this time of year  reports that she saw Dr Vaishali Madrigal only one time  She hasn't been able to follow up with Dr Vaishali Madrigal yet, but plans on calling him     has started to have right shoulder pain over the past few months  She denies a new injury  She also reports some mild rib pain  She crepitus and soreness and sharp pains in the shoulder  Review of Systems   Complete-Female:      Constitutional: no fever-- and-- no chills  Cardiovascular: no chest pain  Respiratory: no shortness of breath  Integumentary: no rashes  The patient presents with complaints of headache (with the shoulder pain )  Psychiatric: anxiety-- and-- depression, but-- as noted in HPI  Active Problems   1  Acute hypersomnolence disorder (780 54) (G47 10)   2  Acute viral bronchitis (466 0) (J20 8)   3  Anemia (285 9) (D64 9)   4  Anxiety (300 00) (F41 9)   5  Bee sting allergy (V15 06) (Z91 038)   6  Depression (311) (F32 9)   7  Fatigue (780 79) (R53 83)   8  Hyperthyroidism (242 90) (E05 90)   9  Sleep disorder (780 50) (G47 9)    Past Medical History   1  History of Asthma (493 90) (J45 909)   2  History of Behavioral Problems (V40 9)   3  History of Birth History   4  History of Ear pain, right (388 70) (H92 01)   5  History of allergic rhinitis (V12 69) (Z87 09)   6  History of allergy (V15 09) (Z88 9)   7  History of candidal vulvovaginitis (V13 29) (Z86 19)   8  History of depression (V11 8) (Z86 59)   9  History of dysuria (V13 00) (Z87 898)   10  History of pneumonia (V12 61) (Z87 01)   11  History of tendinitis (V13 59) (Z87 39)   12  History of upper respiratory infection (V12 09) (Z87 09)   13  History of urinary frequency (V13 09) (Z87 898)   14   History of urinary urgency (V13 00) (Z87 448)   15  History of Need for Menactra vaccination (V03 89) (Z23)   16  History of Otalgia, unspecified laterality (388 70) (H92 09)   17  History of Plantar wart of right foot (078 12) (B07 0)   18  History of PPD screening test (V74 1) (Z11 1)   19  History of Reactive airway disease (493 90) (J45 909)   20  History of Screening for depression (V79 0) (Z13 89)   21  Urinary tract infection (599 0) (N39 0)  Active Problems And Past Medical History Reviewed: The active problems and past medical history were reviewed and updated today  Surgical History   1  History of Dental Surgery   2  History of Ear Pressure Equalization Tube, Insertion, Bilaterally  Surgical History Reviewed: The surgical history was reviewed and updated today  Family History   Mother    1  Family history of Colon cancer   2  Family history of Ovarian cancer  Father    3  Unknown family medical history  Family History    4  Family history of allergies (V19 6) (Z84 89)   5  Family history of diabetes mellitus (V18 0) (Z83 3)   6  Family history of malignant neoplasm of uterus (V16 49) (Z80 49)  Family History Reviewed: The family history was reviewed and updated today  Social History    · Currently sexually active   · Native language   · Never a smoker   · No drug use   · Occasional alcohol use   · Racial background  Social History Reviewed: The social history was reviewed and updated today  The social history was reviewed and is unchanged  Current Meds    1  ALPRAZolam 0 5 MG Oral Tablet; take 1 tablet at bedtime as needed; Last Rx:[id number] Ordered   2  BusPIRone HCl - 10 MG Oral Tablet; TAKE 1 TABLET TWICE DAILY  Requested for: 34Cfq4458; Last     Rx:30Oct2017; Status: ACTIVE - Renewal Denied Ordered   3  DULoxetine HCl - 60 MG Oral Capsule Delayed Release Particles; TAKE 1 CAPSULE EVERY DAY BY     MOUTH  Requested for: 85Rve9441; Last Rx:30Oct2017; Status: ACTIVE - Renewal Denied Ordered   4   EpiPen 2-Jeramy 0 3 MG/0 3ML Injection Solution Auto-injector; INJECT 0 3ML INTRAMUSCULARLY AS     DIRECTED; Therapy: [id number] to (Last Rx:[id number])  Requested for: 13Jjx3007 Ordered   5  Multivitamins TABS; TAKE 1 TABLET DAILY; Therapy: (Recorded:30Oct2017) to Recorded   6  Xyrem 500 MG/ML Oral Solution; 3 25 grams at bedtime and 4 hours after first dose; Therapy: (Recorded:15Jan2018) to Recorded  Medication List Reviewed: The medication list was reviewed and updated today  Allergies   1  Naproxen Sodium TABS  2  Bee sting    Vitals   Vital Signs    Recorded: 23NAY3030 04:31PM   Temperature 99 4 F, Oral   Heart Rate 72, L Radial   Pulse Quality Regular, L Radial   Respiration 16   Systolic 929, LUE, Sitting   Diastolic 70, LUE, Sitting   Height 5 ft 1 54 in   Weight 132 lb 3 2 oz   BMI Calculated 24 55   BSA Calculated 1 59   O2 Saturation 98, RA     Physical Exam        Constitutional      General appearance: No acute distress, well appearing and well nourished  Eyes      Conjunctiva and lids: No swelling, erythema or discharge  Ears, Nose, Mouth, and Throat      External inspection of ears and nose: Normal        Pulmonary      Respiratory effort: No increased work of breathing or signs of respiratory distress  Auscultation of lungs: Clear to auscultation  Cardiovascular      Auscultation of heart: Normal rate and rhythm, normal S1 and S2, without murmurs  Examination of extremities for edema and/or varicosities: Normal        Musculoskeletal      Gait and station: Normal        Inspection/palpation of joints, bones, and muscles: Abnormal  -- (Left shoulder has full ROM and no s/s of rotator cuff damage, however patient reports some non-reproducible pain with motion )      Skin      Skin and subcutaneous tissue: Normal without rashes or lesions  Psychiatric      Orientation to person, place, and time: Normal        Mood and affect: Abnormal   Mood and Affect: anxious  Health Management   Depression   PHevery-9 Adult Depression Screening; every 3 months; Last [id number]; Next Due: 12TXP1506; Overdue  Depression screening   *VB-Depression Screening; every 1 year;  Last 63QNZ8593; Next Due: 53CRD0459; Near Due    Signatures    Electronically signed by : Donavon Olivas; Merlin 15 2018  6:36PM EST                       (Author)     Electronically signed by : Hari Escamilla DO; Jan 18 2018  3:19PM EST

## 2018-05-14 ENCOUNTER — TELEPHONE (OUTPATIENT)
Dept: INTERNAL MEDICINE CLINIC | Age: 23
End: 2018-05-14

## 2018-05-14 NOTE — TELEPHONE ENCOUNTER
Pt was seen by Dr Edson Null back in October 2017 and at that time he told her that he would make a note in her chart and that he would not be refilling any of the psychiatric meds  He instructed her to find a psychiatrist  Pt said she then wanted to speak with his supervisor but never received a call back  Pt stated it is hard for her to get to the appointments as they only have one car and she works; therefore, she still does not have a psychiatrist  Patient wants the statement regarding no more refills on the psychiatric meds removed from her chart and refuses any follow-ups with Dr Edson Null  She will only see Dr Sanket Stringer or [facility]  The patient wants to speak to the  regarding the above  The patient made an appt  for next Monday with [facility]

## 2018-05-16 NOTE — TELEPHONE ENCOUNTER
Called patient back and left message  Based on my understanding there is nothing I am able to do as Dr Yann Oscar made it clear that she is not to be prescribed any more of the medication and needs an appointment with psych in order for them to evaluate and prescribe her the medication per Dr Yann Oscar   As I did not speak to her and she hasn't made contact since her appointment on Monday with Domenico tran

## 2018-05-21 ENCOUNTER — OFFICE VISIT (OUTPATIENT)
Dept: INTERNAL MEDICINE CLINIC | Age: 23
End: 2018-05-21
Payer: COMMERCIAL

## 2018-05-21 VITALS
OXYGEN SATURATION: 99 % | BODY MASS INDEX: 22.78 KG/M2 | SYSTOLIC BLOOD PRESSURE: 110 MMHG | DIASTOLIC BLOOD PRESSURE: 70 MMHG | WEIGHT: 123.8 LBS | TEMPERATURE: 98.1 F | HEIGHT: 62 IN | HEART RATE: 82 BPM

## 2018-05-21 DIAGNOSIS — K29.60 REFLUX GASTRITIS: Primary | ICD-10-CM

## 2018-05-21 DIAGNOSIS — R42 DIZZINESS: ICD-10-CM

## 2018-05-21 DIAGNOSIS — R20.2 PARESTHESIAS: ICD-10-CM

## 2018-05-21 DIAGNOSIS — R30.0 DYSURIA: ICD-10-CM

## 2018-05-21 DIAGNOSIS — E05.90 HYPERTHYROIDISM: ICD-10-CM

## 2018-05-21 PROBLEM — G47.419 NARCOLEPSY: Status: ACTIVE | Noted: 2018-01-15

## 2018-05-21 PROBLEM — M25.511 RIGHT SHOULDER PAIN: Status: ACTIVE | Noted: 2018-01-15

## 2018-05-21 PROBLEM — Z72.821 POOR SLEEP HYGIENE: Status: ACTIVE | Noted: 2017-03-07

## 2018-05-21 PROCEDURE — 99215 OFFICE O/P EST HI 40 MIN: CPT | Performed by: NURSE PRACTITIONER

## 2018-05-21 RX ORDER — OMEPRAZOLE 20 MG/1
20 CAPSULE, DELAYED RELEASE ORAL DAILY
Qty: 30 CAPSULE | Refills: 1 | Status: SHIPPED | OUTPATIENT
Start: 2018-05-21 | End: 2018-09-24

## 2018-05-21 NOTE — PATIENT INSTRUCTIONS
Start Prilosec for suspected gastric ulcer  Stop using any NSAIDs  As these are likely causing your gastric upset  Get MRI for dizziness  Will also get labs  To check for other reasons for symptoms        Return after the MRI to review the results of the MRI and to discuss other chronic conditions

## 2018-05-21 NOTE — PROGRESS NOTES
Assessment/Plan:    No problem-specific Assessment & Plan notes found for this encounter  Diagnoses and all orders for this visit:    Reflux gastritis  -     omeprazole (PriLOSEC) 20 mg delayed release capsule; Take 1 capsule (20 mg total) by mouth daily    Hyperthyroidism  -     TSH, 3rd generation with T4 reflex; Future    Dizziness  -     Comprehensive metabolic panel; Future  -     CBC and differential; Future  -     MRI brain IAC wo and w contrast; Future    Paresthesias  -     MRI brain IAC wo and w contrast; Future      With regards to the patient's abdominal symptoms, symptoms are very consistent with gastritis versus an ulcer  Will trial Prilosec and monitor effect  Should Prilosec not be effective, will consider referral to Gastroenterology for an EGD for potential diagnosis of an ulcer  The patient was advised to stop taking any NSAIDs, as this is likely the causative agent for her abdominal pain  The patient verbalizes understanding and agreement  The patient also notes that she has had dysuria for some time, will order a urinalysis to further assess this complaint  The patient is neurological symptoms are of concern  Will order labs and an MRI to further assess her symptoms and have her follow up within 2-3 days of MRI to review the results and discuss the treatment plan  The patient also had multiple other complaints to discuss during this visit, however the acuity of her other complaints took precedence at this time  Will follow up on these complaints that her next appointment  Patient verbalized understanding and agreement with this plan of care and will schedule her follow-up appointment once her MRI has been scheduled  Greater than 40 min was spent this patient, with more than half that time spent coordinating care or counseling  Subjective:      Patient ID: Mraianne Rivera is a 26 y o  female  Patient presents for an acute visit for multiple complaints      She reports that she has had abdominal pain for the past few weeks  She feels that it has been something that she has been experiencing for many months, however it has worsened in its acuity over the past 1-2 weeks  She reports that she has been experiencing nausea, a constant sensation of heartburn, stomach cramping, bloating, and a gnawing sensation in her stomach  She is unsure of whether food is helpful or not, but she does note that she sometimes feels that some foods or aggravating to her symptoms such as caffeine and dairy, whereas other foods help alleviate her symptoms  She also notes that she has a metallic taste in her mouth and sometimes does not feel full after eating, while other times she feels very full without eating  She reports that she has not vomited, but intermittently has diarrhea  She denies black, tarry stools  She does admit to taking NSAIDs quite often for lower back pain  When further questioned about this, she admits to taking 600 mg to 1000 mg at a time about 2 to 3 times a day for the past 2-3 months  She also admits to being on many NSAIDs in the past for other chronic pain  The patient also mentions that she has had episodes of dizziness upon standing, visual changes and having the sensation of pins and needles throughout her body daily for the past month  She reports that it has gotten worse over the past months in severity and frequency  She reports that the visual changes are related to [address] and feeling like she might lose consciousness  She denies syncope she also reports that she has had a sensation of having low blood sugar, which has been alleviated by eating food and sitting down  She reports that she has never had head imaging or workup for the symptoms            The following portions of the patient's history were reviewed and updated as appropriate: allergies, current medications, past family history, past medical history, past social history, past surgical history and problem list     Review of Systems   Constitutional: Positive for diaphoresis (hot flashes)  Negative for chills and fever  HENT: Positive for congestion  Negative for trouble swallowing  Eyes: Negative for pain  Respiratory: Positive for chest tightness (Associated with anxiety)  Negative for shortness of breath  Cardiovascular: Negative for chest pain and palpitations  Gastrointestinal: Positive for abdominal pain (per HPI), diarrhea and nausea  Negative for anal bleeding, blood in stool and vomiting  Genitourinary: Positive for dysuria  Musculoskeletal: Positive for back pain (per HPI)  Skin: Negative for rash  Neurological: Positive for dizziness (per HPI) and numbness (per HPI)  Negative for syncope  Psychiatric/Behavioral: The patient is nervous/anxious (at baseline)  Past Medical History:   Diagnosis Date    Anxiety     Asthma 11/19/2009    Depression     last assessed 7/24/15    Disease of thyroid gland     History of behavior problem     Narcolepsy     Pneumonia     Reactive airway disease 05/13/2009         Current Outpatient Prescriptions:     ALPRAZolam (XANAX) 0 5 mg tablet, Take 1 tablet by mouth daily at bedtime as needed, Disp: , Rfl:     busPIRone (BUSPAR) 10 mg tablet, Take 1 tablet by mouth 2 (two) times a day, Disp: , Rfl:     DULoxetine (CYMBALTA) 60 mg delayed release capsule, Take by mouth, Disp: , Rfl:     EPINEPHrine (EPIPEN 2-YURIY) 0 3 mg/0 3 mL SOAJ, Inject 0 3 mL as directed, Disp: , Rfl:     Multiple Vitamin (MULTIVITAMINS PO), Take 1 tablet by mouth daily, Disp: , Rfl:     Sodium Oxybate (XYREM) 500 MG/ML SOLN, Take by mouth, Disp: , Rfl:     omeprazole (PriLOSEC) 20 mg delayed release capsule, Take 1 capsule (20 mg total) by mouth daily, Disp: 30 capsule, Rfl: 1    Allergies   Allergen Reactions    Naproxen Shortness Of Breath    Bee Venom Edema     Reaction Date: [id number]; Category:  Allergy;        Social History   Past Surgical History:   Procedure Laterality Date    DENTAL SURGERY      TYMPANOSTOMY TUBE PLACEMENT Bilateral     last assessed 7/24/15     Family History   Problem Relation Age of Onset    Colon cancer Mother     Ovarian cancer Mother     No Known Problems Father     Allergies Family     Diabetes Family     Uterine cancer Family        Objective:  /70 (BP Location: Left arm, Patient Position: Sitting, Cuff Size: Standard)   Pulse 82   Temp 98 1 °F (36 7 °C) (Tympanic)   Ht 5' 2" (1 575 m)   Wt 56 2 kg (123 lb 12 8 oz)   SpO2 99%   BMI 22 64 kg/m²        Physical Exam   Constitutional: She is oriented to person, place, and time  She appears well-developed and well-nourished  No distress  HENT:   Head: Normocephalic and atraumatic  Right Ear: External ear normal    Left Ear: External ear normal    Mouth/Throat: Oropharynx is clear and moist  No oropharyngeal exudate  Eyes: Conjunctivae are normal  Pupils are equal, round, and reactive to light  No scleral icterus  Neck: Normal range of motion  Neck supple  No thyromegaly present  Cardiovascular: Normal rate and regular rhythm  Exam reveals no gallop and no friction rub  No murmur heard  No carotid bruit noted   Pulmonary/Chest: Effort normal and breath sounds normal  No respiratory distress  She exhibits no tenderness  Abdominal: Soft  Bowel sounds are normal  She exhibits no distension and no mass  There is no tenderness  There is no rebound and no guarding  Musculoskeletal: Normal range of motion  She exhibits no edema  Lymphadenopathy:     She has no cervical adenopathy  Neurological: She is alert and oriented to person, place, and time  She displays normal reflexes  No cranial nerve deficit  She exhibits normal muscle tone  Coordination normal    Skin: Skin is warm and dry  She is not diaphoretic  Psychiatric: She has a normal mood and affect  Her behavior is normal  Judgment and thought content normal    Vitals reviewed

## 2018-06-07 NOTE — PROGRESS NOTES
PT Discharge    Today's date: 2018  Patient name: Bernard Chakraborty  : 1995  MRN: [id number]  Referring provider: ALVARO Ontiveros  Dx:   Encounter Diagnosis     ICD-10-CM    1  Bilateral shoulder pain, unspecified chronicity M25 511     M25 512    2  Right shoulder pain, unspecified chronicity M25 511    3  Chronic right shoulder pain M25 511     G89 29        Start Time: 1600  Stop Time: 1645  Total time in clinic (min): 45 minutes    Assessment  Patient presents with symptom irritability no  Assessment details: Bernard Chakraborty is a 23y o  year old female who presents to IE with:   Chronic right shoulder pain  (primary encounter diagnosis)  Pain in right shoulder    Vickeygisel Epstein has made the following improvements since beginning PT: decreased pain, increased ROM, increased strength and increased tolerance to activities   Anderson Epstein and PT mutually agree to transition to HEP at this time secondary to gains made in PT  she has been given updated HEP with verbalized understanding and compliance  Anderson Lucian is encouraged to contact PT with any questions or concerns in the future  Understanding of Dx/Px/POC: good   Prognosis: good  Prognosis details: Patient's prognosis may be affected by the following: anxiety, depression, bladder retention, narcolepsy, back pain    Goals  Short-term goals:   1  Patient's pain will be decreased to less than 3/10 within 4 weeks- Met  2  Patient's strength will increase to 4+/5 within 4 weeks -  Met    Long-term goals:   1  Patient will be able to perform lift bilateral UE with minimal to no pain  -  Met  2  Patient will be able to perform perform IADL with minimal to no pain  - Met  3  Patient will be independent in HEP -  Met    Plan  Treatment plan discussed with: patient and PTA  Plan details: Thank you for referring Bernard Chakraborty to Physical Therapy at [place] [address] and for the opportunity to coordinate care              Subjective Evaluation    History of Present Illness  Mechanism of injury: Patient has been seen for total of 18 visits for OP PT for bilateral shoulder pain  Patient rates overall improvement since beginning PT 85%  Patient reports improvements with lifting, reaching, overhead activity, and work duties including chopping and using salad spinner  Patient reports most difficulty with  repetitive shoulder movements including heavy lifting and lifting overhead  [place] and PT mutually agree to transition to HEP at this time secondary to gains made in PT  Patient given updated HEP with verbalized understanding and compliance  she is encouraged to contact PT with any questions or concerns in the future  Quality of life: fair    Pain  Current pain ratin  At best pain ratin  At worst pain rating: 3  Quality: dull ache  Aggravating factors: overhead activity and lifting    Treatments  Previous treatment: physical therapy  Current treatment: physical therapy  Patient Goals  Patient goals for therapy: decreased pain, increased motion, increased strength and independence with ADLs/IADLs          Objective     Postural Observations  Seated posture: fair  Standing posture: fair    Additional Postural Observation Details  Patient demonstrates increased thoracic kyphosis, cervical protrusion, and rounded shoulders     Palpation   Left   Hypertonic in the upper trapezius  Right   Hypertonic in the upper trapezius  Cervical/Thoracic Screen   Cervical range of motion within normal limits  Cervical range of motion within normal limits with the following exceptions: Cervical flexion: 50 degrees  Cervical extension: 40 degrees  R lateral flexion: 25 degrees  L lateral flexion: 25 degrees  R lateral rotation: 65 degrees  L lateral rotation: 65 degrees    Thoracic range of motion within normal limits  Thoracic range of motion within normal limits with the following exceptions: 50% bilateral thoracic rotation with pain reported bilaterally       Neurological Testing Sensation     Shoulder   Left Shoulder   Intact: light touch    Right Shoulder   Intact: light touch    Active Range of Motion   Left Shoulder   Flexion: 165 degrees   Abduction: 165 degrees     Right Shoulder   Flexion: 165 degrees   Abduction: 165 degrees     Passive Range of Motion   Left Shoulder   Flexion: 170 degrees   Abduction: 170 degrees   External rotation 90°: 90 degrees   Internal rotation 90°: 70 degrees     Right Shoulder   Flexion: 170 degrees   Abduction: 170 degrees   External rotation 90°: 90 degrees   Internal rotation 90°: 70 degrees     Strength/Myotome Testing     Left Shoulder     Planes of Motion   Flexion: 4+   Abduction: 4   External rotation at 0°: 4   Internal rotation at 0°: 4     Right Shoulder     Planes of Motion   Flexion: 4+   Abduction: 4   External rotation at 0°: 4   Internal rotation at 0°: 4     General Comments     Shoulder Comments   CONSTITUTIONAL: No fever, no chills, no malaise, no recent weight loss or gain   EYES: No complaints of vision changes, no red eyes, no diplopia   ENT: no loss of hearing, no tinnitus, no nosebleeds, no sore throat, no dysphasia, no dysphagia  CARDIOVASCULAR: no complaints of chest pain, no palpitations, no LE edema  RESPIRATORY: no complaints of SOB, no wheezing, no cough  GASTROINTESTINAL: no reports of abdominal pain, no constipation, no diarrhea, no vomiting, no bloody stools, no other changes in digestion, no loss of control of bowel  GENITOURINARY: no reports of dysuria, no incontinence, no loss of control of bladder; patient has bladder retention  INTEGUMENTARY: no complaints of skin rash or irritation, no bleeding or drainage   NEUROLOGICAL:  DTR, myotomes, and dermatomes performed in neurological section; patient has narcolepsy  ALLERGIES: bananas, apples, pumpkin, bees  SURGERIES: bilateral tubes in ears, teeth removal  MEDICATIONS: Meloxicam         Flowsheet Rows      Most Recent Value   PT/OT G-Codes   Current Score  62 Projected Score  69   Assessment Type  Discharge   G code set  Carrying, Moving & Handling Objects   Carrying, Moving and Handling Objects Goal Status ()  CJ   Carrying, Moving and Handling Objects Discharge Status ()  [place]

## 2018-06-14 LAB
ALBUMIN SERPL-MCNC: 4.4 G/DL (ref 3.6–5.1)
ALBUMIN/GLOB SERPL: 1.9 (CALC) (ref 1–2.5)
ALP SERPL-CCNC: 54 U/L (ref 33–115)
ALT SERPL-CCNC: 18 U/L (ref 6–29)
AMORPH SED URNS QL MICRO: ABNORMAL /HPF
APPEARANCE UR: CLEAR
AST SERPL-CCNC: 17 U/L (ref 10–30)
BACTERIA UR CULT: ABNORMAL
BACTERIA UR QL AUTO: ABNORMAL /HPF
BASOPHILS # BLD AUTO: 38 CELLS/UL (ref 0–200)
BASOPHILS NFR BLD AUTO: 0.7 %
BILIRUB SERPL-MCNC: 0.5 MG/DL (ref 0.2–1.2)
BILIRUB UR QL STRIP: NEGATIVE
BUN SERPL-MCNC: 12 MG/DL (ref 7–25)
BUN/CREAT SERPL: NORMAL (CALC) (ref 6–22)
CALCIUM SERPL-MCNC: 9.3 MG/DL (ref 8.6–10.2)
CHLORIDE SERPL-SCNC: 102 MMOL/L (ref 98–110)
CO2 SERPL-SCNC: 27 MMOL/L (ref 20–31)
COLOR UR: YELLOW
CREAT SERPL-MCNC: 0.72 MG/DL (ref 0.5–1.1)
EOSINOPHIL # BLD AUTO: 59 CELLS/UL (ref 15–500)
EOSINOPHIL NFR BLD AUTO: 1.1 %
ERYTHROCYTE [DISTWIDTH] IN BLOOD BY AUTOMATED COUNT: 11.8 % (ref 11–15)
GLOBULIN SER CALC-MCNC: 2.3 G/DL (CALC) (ref 1.9–3.7)
GLUCOSE SERPL-MCNC: 90 MG/DL (ref 65–99)
GLUCOSE UR QL STRIP: NEGATIVE
HCT VFR BLD AUTO: 39.1 % (ref 35–45)
HGB BLD-MCNC: 13.1 G/DL (ref 11.7–15.5)
HGB UR QL STRIP: NEGATIVE
HYALINE CASTS #/AREA URNS LPF: ABNORMAL /LPF
KETONES UR QL STRIP: NEGATIVE
LEUKOCYTE ESTERASE UR QL STRIP: NEGATIVE
LYMPHOCYTES # BLD AUTO: 2360 CELLS/UL (ref 850–3900)
LYMPHOCYTES NFR BLD AUTO: 43.7 %
MCH RBC QN AUTO: 30.3 PG (ref 27–33)
MCHC RBC AUTO-ENTMCNC: 33.5 G/DL (ref 32–36)
MCV RBC AUTO: 90.3 FL (ref 80–100)
MONOCYTES # BLD AUTO: 518 CELLS/UL (ref 200–950)
MONOCYTES NFR BLD AUTO: 9.6 %
NEUTROPHILS # BLD AUTO: 2425 CELLS/UL (ref 1500–7800)
NEUTROPHILS NFR BLD AUTO: 44.9 %
NITRITE UR QL STRIP: NEGATIVE
PH UR STRIP: 7 [PH] (ref 5–8)
PLATELET # BLD AUTO: 204 THOUSAND/UL (ref 140–400)
PMV BLD REES-ECKER: 11.9 FL (ref 7.5–12.5)
POTASSIUM SERPL-SCNC: 4.3 MMOL/L (ref 3.5–5.3)
PROT SERPL-MCNC: 6.7 G/DL (ref 6.1–8.1)
PROT UR QL STRIP: NEGATIVE
RBC # BLD AUTO: 4.33 MILLION/UL (ref 3.8–5.1)
RBC #/AREA URNS HPF: ABNORMAL /HPF
SL AMB EGFR AFRICAN AMERICAN: 139 ML/MIN/1.73M2
SL AMB EGFR NON AFRICAN AMERICAN: 120 ML/MIN/1.73M2
SODIUM SERPL-SCNC: 136 MMOL/L (ref 135–146)
SP GR UR STRIP: 1.02 (ref 1–1.03)
SQUAMOUS #/AREA URNS HPF: ABNORMAL /HPF
TSH SERPL-ACNC: 4 MIU/L
WBC # BLD AUTO: 5.4 THOUSAND/UL (ref 3.8–10.8)
WBC #/AREA URNS HPF: ABNORMAL /HPF

## 2018-06-24 ENCOUNTER — HOSPITAL ENCOUNTER (OUTPATIENT)
Dept: MRI IMAGING | Facility: HOSPITAL | Age: 23
Discharge: HOME | End: 2018-06-24

## 2018-06-24 DIAGNOSIS — R42 DIZZINESS: ICD-10-CM

## 2018-06-24 DIAGNOSIS — R20.2 PARESTHESIAS: ICD-10-CM

## 2018-07-03 ENCOUNTER — TELEPHONE (OUTPATIENT)
Dept: INTERNAL MEDICINE CLINIC | Facility: CLINIC | Age: 23
End: 2018-07-03

## 2018-07-03 DIAGNOSIS — E05.90 HYPERTHYROIDISM: Primary | ICD-10-CM

## 2018-07-03 NOTE — TELEPHONE ENCOUNTER
THIS PT IS SCHEDULED TO SEE ENDOCRINOLOGY CLINIC IN THE [facility]  CAN YOU PLEASE ENTER A NEW ORDER?

## 2018-09-05 ENCOUNTER — HOSPITAL ENCOUNTER (EMERGENCY)
Facility: HOSPITAL | Age: 23
Discharge: HOME | End: 2018-09-06
Attending: EMERGENCY MEDICINE | Admitting: EMERGENCY MEDICINE
Payer: COMMERCIAL

## 2018-09-05 DIAGNOSIS — R10.84 GENERALIZED ABDOMINAL PAIN: Primary | ICD-10-CM

## 2018-09-05 DIAGNOSIS — G47.411 PRIMARY NARCOLEPSY WITH CATAPLEXY: Primary | ICD-10-CM

## 2018-09-05 LAB
ALBUMIN SERPL BCP-MCNC: 4.3 G/DL (ref 3.5–5)
ALP SERPL-CCNC: 59 U/L (ref 46–116)
ALT SERPL W P-5'-P-CCNC: 32 U/L (ref 12–78)
ANION GAP SERPL CALCULATED.3IONS-SCNC: 5 MMOL/L (ref 4–13)
AST SERPL W P-5'-P-CCNC: 17 U/L (ref 5–45)
BASOPHILS # BLD AUTO: 0.03 THOUSANDS/ΜL (ref 0–0.1)
BASOPHILS NFR BLD AUTO: 0 % (ref 0–1)
BILIRUB SERPL-MCNC: 0.4 MG/DL (ref 0.2–1)
BILIRUB UR QL STRIP: NEGATIVE
BUN SERPL-MCNC: 12 MG/DL (ref 5–25)
CALCIUM SERPL-MCNC: 9.5 MG/DL (ref 8.3–10.1)
CHLORIDE SERPL-SCNC: 103 MMOL/L (ref 100–108)
CLARITY UR: CLEAR
CO2 SERPL-SCNC: 29 MMOL/L (ref 21–32)
COLOR UR: YELLOW
CREAT SERPL-MCNC: 0.66 MG/DL (ref 0.6–1.3)
EOSINOPHIL # BLD AUTO: 0.07 THOUSAND/ΜL (ref 0–0.61)
EOSINOPHIL NFR BLD AUTO: 1 % (ref 0–6)
ERYTHROCYTE [DISTWIDTH] IN BLOOD BY AUTOMATED COUNT: 12.2 % (ref 11.6–15.1)
EXT PREG TEST URINE: NEGATIVE
GFR SERPL CREATININE-BSD FRML MDRD: 127 ML/MIN/1.73SQ M
GLUCOSE SERPL-MCNC: 82 MG/DL (ref 65–140)
GLUCOSE UR STRIP-MCNC: NEGATIVE MG/DL
HCT VFR BLD AUTO: 38.6 % (ref 34.8–46.1)
HGB BLD-MCNC: 12.8 G/DL (ref 11.5–15.4)
HGB UR QL STRIP.AUTO: NEGATIVE
IMM GRANULOCYTES # BLD AUTO: 0.01 THOUSAND/UL (ref 0–0.2)
IMM GRANULOCYTES NFR BLD AUTO: 0 % (ref 0–2)
KETONES UR STRIP-MCNC: ABNORMAL MG/DL
LEUKOCYTE ESTERASE UR QL STRIP: NEGATIVE
LIPASE SERPL-CCNC: 222 U/L (ref 73–393)
LYMPHOCYTES # BLD AUTO: 3.76 THOUSANDS/ΜL (ref 0.6–4.47)
LYMPHOCYTES NFR BLD AUTO: 56 % (ref 14–44)
MCH RBC QN AUTO: 29.8 PG (ref 26.8–34.3)
MCHC RBC AUTO-ENTMCNC: 33.2 G/DL (ref 31.4–37.4)
MCV RBC AUTO: 90 FL (ref 82–98)
MONOCYTES # BLD AUTO: 0.58 THOUSAND/ΜL (ref 0.17–1.22)
MONOCYTES NFR BLD AUTO: 9 % (ref 4–12)
NEUTROPHILS # BLD AUTO: 2.32 THOUSANDS/ΜL (ref 1.85–7.62)
NEUTS SEG NFR BLD AUTO: 34 % (ref 43–75)
NITRITE UR QL STRIP: NEGATIVE
NRBC BLD AUTO-RTO: 0 /100 WBCS
PH UR STRIP.AUTO: 8.5 [PH] (ref 4.5–8)
PLATELET # BLD AUTO: 248 THOUSANDS/UL (ref 149–390)
PMV BLD AUTO: 11.1 FL (ref 8.9–12.7)
POTASSIUM SERPL-SCNC: 3.5 MMOL/L (ref 3.5–5.3)
PROT SERPL-MCNC: 7.7 G/DL (ref 6.4–8.2)
PROT UR STRIP-MCNC: NEGATIVE MG/DL
RBC # BLD AUTO: 4.29 MILLION/UL (ref 3.81–5.12)
SODIUM SERPL-SCNC: 137 MMOL/L (ref 136–145)
SP GR UR STRIP.AUTO: 1.02 (ref 1–1.03)
UROBILINOGEN UR QL STRIP.AUTO: 0.2 E.U./DL
WBC # BLD AUTO: 6.77 THOUSAND/UL (ref 4.31–10.16)

## 2018-09-05 PROCEDURE — 80053 COMPREHEN METABOLIC PANEL: CPT | Performed by: EMERGENCY MEDICINE

## 2018-09-05 PROCEDURE — 36415 COLL VENOUS BLD VENIPUNCTURE: CPT | Performed by: EMERGENCY MEDICINE

## 2018-09-05 PROCEDURE — 85025 COMPLETE CBC W/AUTO DIFF WBC: CPT | Performed by: EMERGENCY MEDICINE

## 2018-09-05 PROCEDURE — 81025 URINE PREGNANCY TEST: CPT | Performed by: EMERGENCY MEDICINE

## 2018-09-05 PROCEDURE — 81003 URINALYSIS AUTO W/O SCOPE: CPT

## 2018-09-05 PROCEDURE — 83690 ASSAY OF LIPASE: CPT | Performed by: EMERGENCY MEDICINE

## 2018-09-06 ENCOUNTER — APPOINTMENT (EMERGENCY)
Dept: CT IMAGING | Facility: HOSPITAL | Age: 23
End: 2018-09-06
Payer: COMMERCIAL

## 2018-09-06 VITALS
OXYGEN SATURATION: 99 % | HEART RATE: 58 BPM | RESPIRATION RATE: 20 BRPM | BODY MASS INDEX: 23.02 KG/M2 | DIASTOLIC BLOOD PRESSURE: 64 MMHG | WEIGHT: 125.88 LBS | SYSTOLIC BLOOD PRESSURE: 109 MMHG | TEMPERATURE: 98.1 F

## 2018-09-06 PROCEDURE — 99284 EMERGENCY DEPT VISIT MOD MDM: CPT

## 2018-09-06 PROCEDURE — 74177 CT ABD & PELVIS W/CONTRAST: CPT

## 2018-09-06 RX ADMIN — SODIUM CHLORIDE 500 ML: 0.9 INJECTION, SOLUTION INTRAVENOUS at 00:40

## 2018-09-06 RX ADMIN — IOHEXOL 100 ML: 350 INJECTION, SOLUTION INTRAVENOUS at 01:24

## 2018-09-06 NOTE — ED PROVIDER NOTES
History  Chief Complaint   Patient presents with    Abdominal Pain     Patient c/o RUQ pain, bloating, and distension x 1 month  Also c/o right shoulder pain  Patient added, "I do get short of breath too "      26 yr female with hx of anxiety--  Has several comps-- 1 month of abd pain/bloating/ distention -- with over last 3 days of upper abd pain sharp- unrelated to eating-- -- c/o sob- related to bloating - no fevers- uri/cough/ no cp- no vte-- no fevers-  No n/v/d- no gu/gyn comps -- c/o anxiety-  With tingling around mouth took xanax at home with imporvement        History provided by:  Patient   used: No    Abdominal Pain   Associated symptoms: shortness of breath    Associated symptoms: no chest pain, no constipation, no cough, no diarrhea, no nausea and no vomiting        Prior to Admission Medications   Prescriptions Last Dose Informant Patient Reported? Taking?    ALPRAZolam (XANAX) 0 5 mg tablet   Yes No   Sig: Take 1 tablet by mouth daily at bedtime as needed   DULoxetine (CYMBALTA) 60 mg delayed release capsule   Yes No   Sig: Take by mouth   EPINEPHrine (EPIPEN 2-YURIY) 0 3 mg/0 3 mL SOAJ   Yes No   Sig: Inject 0 3 mL as directed   Multiple Vitamin (MULTIVITAMINS PO)   Yes No   Sig: Take 1 tablet by mouth daily   Sodium Oxybate (XYREM) 500 MG/ML SOLN   No No   Sig: Take 0 65 mL (325 mg total) by mouth 2 (two) times a day for 30 days Take at bedtime and then 2-3 hours later   busPIRone (BUSPAR) 10 mg tablet   Yes No   Sig: Take 1 tablet by mouth 2 (two) times a day   omeprazole (PriLOSEC) 20 mg delayed release capsule   No No   Sig: Take 1 capsule (20 mg total) by mouth daily      Facility-Administered Medications: None       Past Medical History:   Diagnosis Date    Anxiety     Asthma 11/19/2009    Depression     last assessed 7/24/15    Disease of thyroid gland     History of behavior problem     Narcolepsy     Pneumonia     Reactive airway disease 05/13/2009       Past Surgical History:   Procedure Laterality Date    DENTAL SURGERY      TYMPANOSTOMY TUBE PLACEMENT Bilateral     last assessed 7/24/15       Family History   Problem Relation Age of Onset    Colon cancer Mother     Ovarian cancer Mother     No Known Problems Father     Allergies Family     Diabetes Family     Uterine cancer Family      I have reviewed and agree with the history as documented  Social History   Substance Use Topics    Smoking status: Never Smoker    Smokeless tobacco: Never Used    Alcohol use Yes      Comment: Social         Review of Systems   Constitutional: Negative  HENT: Negative  Eyes: Negative  Respiratory: Positive for shortness of breath  Negative for apnea, cough, choking, chest tightness, wheezing and stridor  Cardiovascular: Negative  Negative for chest pain, palpitations and leg swelling  Gastrointestinal: Positive for abdominal distention and abdominal pain  Negative for anal bleeding, blood in stool, constipation, diarrhea, nausea, rectal pain and vomiting  Endocrine: Negative  Genitourinary: Negative  Musculoskeletal: Negative  Skin: Negative  Allergic/Immunologic: Negative  Neurological: Positive for numbness  Negative for dizziness, tremors, seizures, syncope, facial asymmetry, speech difficulty, weakness, light-headedness and headaches  Hematological: Negative  Psychiatric/Behavioral: Negative for agitation, behavioral problems, confusion, decreased concentration, dysphoric mood, hallucinations, self-injury, sleep disturbance and suicidal ideas  The patient is nervous/anxious  The patient is not hyperactive  Physical Exam  Physical Exam   Constitutional: She is oriented to person, place, and time  She appears well-developed and well-nourished  No distress  avss-- in nad-- pulse ox 100 % on ra- interpretation- is normal- no intervention    HENT:   Head: Normocephalic and atraumatic     Eyes: Conjunctivae and EOM are normal  Pupils are equal, round, and reactive to light  Right eye exhibits no discharge  Left eye exhibits no discharge  No scleral icterus  Mm  pink   Neck: Normal range of motion  Neck supple  No JVD present  No tracheal deviation present  No thyromegaly present  Cardiovascular: Normal rate, regular rhythm, normal heart sounds and intact distal pulses  Exam reveals no gallop and no friction rub  No murmur heard  Pulmonary/Chest: Effort normal and breath sounds normal  No stridor  No respiratory distress  She has no wheezes  She has no rales  She exhibits no tenderness  Abdominal: Soft  Bowel sounds are normal  She exhibits distension  She exhibits no mass  There is no tenderness  There is no rebound and no guarding  No hernia  Diffuse mild tenderness- no peritoneal signs-- bilateral cva tendenress   Musculoskeletal: Normal range of motion  She exhibits no edema, tenderness or deformity  Equal bilateral radial/dp pulses- no ble edema/calf tebnderness/assym/ erythema   Lymphadenopathy:     She has no cervical adenopathy  Neurological: She is alert and oriented to person, place, and time  No cranial nerve deficit or sensory deficit  She exhibits normal muscle tone  Coordination normal    Skin: Skin is warm  Capillary refill takes less than 2 seconds  No rash noted  She is not diaphoretic  No erythema  No pallor  Psychiatric: Her behavior is normal    Anxious appearing   Nursing note and vitals reviewed        Vital Signs  ED Triage Vitals   Temperature Pulse Respirations Blood Pressure SpO2   09/05/18 2248 09/05/18 2247 09/05/18 2247 09/05/18 2247 09/05/18 2247   98 1 °F (36 7 °C) 73 20 131/80 100 %      Temp Source Heart Rate Source Patient Position - Orthostatic VS BP Location FiO2 (%)   09/05/18 2248 09/05/18 2247 09/05/18 2247 09/05/18 2247 --   Oral Monitor Lying Right arm       Pain Score       --                  Vitals:    09/05/18 2247   BP: 131/80   Pulse: 73   Patient Position - Orthostatic VS: Lying       Visual Acuity      ED Medications  Medications - No data to display    Diagnostic Studies  Results Reviewed     Procedure Component Value Units Date/Time    CBC and differential [[id number]]     Lab Status:  No result Specimen:  Blood     CMP [[id number]]     Lab Status:  No result Specimen:  Blood     Lipase [[id number]]     Lab Status:  No result Specimen:  Blood     POCT pregnancy, urine [[id number]]     Lab Status:  No result Specimen:  Urine                  No orders to display              Procedures  Procedures       Phone Contacts  ED Phone Contact    ED Course  ED Course as of Sep 06 0149   Wed Sep 05, 2018   2301 Er md note- pt offered pain medication- refuses at this time    2301 Er md medical decision making note- pt is low risk for pe by well;'s score- score of 0- perc-neg    Thu Sep 06, 2018   0019 Er md note- pt re-examined- on abd exam--  has umbilical/rlq pain -- will order ct scan                                 [facility]  CritCare Time    Disposition  Final diagnoses:   None     ED Disposition     None      Follow-up Information    None         Patient's Medications   Discharge Prescriptions    No medications on file     No discharge procedures on file      ED Provider  Electronically Signed by           Sebastian Jones MD  09/06/18 [id number]

## 2018-09-06 NOTE — DISCHARGE INSTRUCTIONS
Diagnosis: abdominal pain     - diet/activity as tolerated     - would recommend taking over the counter tylenol 500 mg every 4 hrs as needed for pain / cramping    - if abdominal symptoms persist- please call your primary doctor to schedule an appointment    - please return to  t  er for any fevers- temp > 100 4/ any worsening/ intractable abdominal pain / any persistent vomiting/ any bloody /coffee ground vomiting/ any black tarry / bloody stools or any new/ worsening/concerning symptoms to you     - if anxiety becomes more of a problem--  Please call outpatient behavioral health  [phone] to schedule an appointment

## 2018-09-07 ENCOUNTER — TELEPHONE (OUTPATIENT)
Dept: SLEEP CENTER | Facility: CLINIC | Age: 23
End: 2018-09-07

## 2018-09-07 NOTE — TELEPHONE ENCOUNTER
Pharmacy was looking for clarification of Xyrem order  3 25 grams (first dose) followed by 3 25 grams for second dose  30 day supply  5 refills

## 2018-09-24 ENCOUNTER — OFFICE VISIT (OUTPATIENT)
Dept: INTERNAL MEDICINE CLINIC | Age: 23
End: 2018-09-24
Payer: COMMERCIAL

## 2018-09-24 VITALS
BODY MASS INDEX: 21.97 KG/M2 | WEIGHT: 119.4 LBS | TEMPERATURE: 98.3 F | OXYGEN SATURATION: 99 % | HEIGHT: 62 IN | DIASTOLIC BLOOD PRESSURE: 78 MMHG | SYSTOLIC BLOOD PRESSURE: 108 MMHG | HEART RATE: 90 BPM

## 2018-09-24 DIAGNOSIS — R10.84 GENERALIZED ABDOMINAL PAIN: Primary | ICD-10-CM

## 2018-09-24 DIAGNOSIS — F41.9 ANXIETY: ICD-10-CM

## 2018-09-24 PROCEDURE — 99214 OFFICE O/P EST MOD 30 MIN: CPT | Performed by: NURSE PRACTITIONER

## 2018-09-24 RX ORDER — BUSPIRONE HYDROCHLORIDE 5 MG/1
5 TABLET ORAL 2 TIMES DAILY
Qty: 60 TABLET | Refills: 0 | Status: SHIPPED | OUTPATIENT
Start: 2018-09-24 | End: 2018-10-26

## 2018-09-24 NOTE — PROGRESS NOTES
Assessment/Plan:    Patient presents for diffuse abdominal bloating, cramping, distension x1 month  Her symptoms are aggravated by eating  The improved by not eating  Likely a dietary component  Additionally patient has anxiety and stress is exacerbating  Patient with recent blood work 09/05/2018 with a CMP and CBC within normal limits  She was also noted to have a normal TSH 06/13/2018  Her exam was unremarkable  Will start an elimination diet  The 1st eliminate dairy for approximately two weeks  If her symptoms resolve that continue to eliminate dairy from her diet  If her symptoms persist he may rehab [facility] and then would recommend eliminating week/gluten from her diet  Patient was given handout on gluten sources  If symptoms resolve then would recommend eliminating gluten/week from her diet  If your symptoms persist continue limitation until you can determine a source  Discussed with patient that there is likely an IBS component given her age and increased stress  She had do not feel that starting this medications would be beneficial at this time until her anxiety is better controlled  Recommend the patient has adequate source of fiber with fruits and vegetables  Additionally stays well hydrated  Patient is having some increased anxiety  She has been off of her BuSpar, Cymbalta, Xanax for a few months  Will restart BuSpar  She was previously taking 10 mg b i d  however she did have some drowsiness with that dosing  Will restart 5 mg b i d  S the patient that she does need to be established with a psychiatrist   In the process of looking and will contact her insurance regarding coverage/providers as she does have Vera  Also of note patient does have history of narcolepsy and is followed by Neurology and is currently taking Xyrem    Patient to return for follow-up in one month  Do not feel necessary to repeat blood work at this time       Diagnoses and all orders for this visit:    Generalized abdominal pain    Anxiety  -     busPIRone (BUSPAR) 5 mg tablet; Take 1 tablet (5 mg total) by mouth 2 (two) times a day        Subjective:      Patient ID: Tracey Oh is a 26 y o  female  Abdominal Pain   This is a new problem  Episode onset: 1 month  The onset quality is gradual  The problem occurs intermittently  The problem has been waxing and waning  The pain is located in the generalized abdominal region  The quality of the pain is aching and a sensation of fullness (bloating)  The abdominal pain does not radiate  Associated symptoms include anorexia, belching, constipation, diarrhea (loose stool) and nausea  Pertinent negatives include no dysuria, fever, flatus, frequency, headaches, hematuria, melena, vomiting or weight loss  The pain is aggravated by eating  Relieved by: not eating  She has tried nothing for the symptoms  The treatment provided no relief  Additionally patient reports that she has been having some increased anxiety and stress  Patient current partner reports that she is a germaphobe  Patient and her partner currently living with her partner's mother who required some medical help as well as being incontinence  The patient is having increased stress due to the situation  Also of note she has previously on BuSpar, Cymbalta, Xanax however has not been on these medications in over a few months  The following portions of the patient's history were reviewed and updated as appropriate: allergies, current medications, past family history, past medical history, past social history, past surgical history and problem list     Review of Systems   Constitutional: Positive for appetite change (decreased)  Negative for chills, fatigue, fever, unexpected weight change and weight loss  HENT: Negative for congestion, rhinorrhea, sinus pain, sinus pressure and sore throat      Respiratory: Negative for cough, chest tightness, shortness of breath and wheezing  Cardiovascular: Negative for chest pain, palpitations and leg swelling  Gastrointestinal: Positive for abdominal pain, anorexia, constipation, diarrhea (loose stool) and nausea  Negative for flatus, melena and vomiting  Genitourinary: Negative for difficulty urinating, dysuria, frequency, hematuria, menstrual problem, pelvic pain and vaginal discharge  Neurological: Negative for dizziness, light-headedness and headaches  Psychiatric/Behavioral: The patient is nervous/anxious  Past Medical History:   Diagnosis Date    Anxiety     Asthma 11/19/2009    Depression     last assessed 7/24/15    Disease of thyroid gland     History of behavior problem     Narcolepsy     Pneumonia     Reactive airway disease 05/13/2009         Current Outpatient Prescriptions:     Sodium Oxybate (XYREM) 500 MG/ML SOLN, Take 0 65 mL (325 mg total) by mouth 2 (two) times a day for 30 days Take at bedtime and then 2-3 hours later, Disp: 19 5 g, Rfl: 0    busPIRone (BUSPAR) 5 mg tablet, Take 1 tablet (5 mg total) by mouth 2 (two) times a day, Disp: 60 tablet, Rfl: 0    EPINEPHrine (EPIPEN 2-YURIY) 0 3 mg/0 3 mL SOAJ, Inject 0 3 mL as directed, Disp: , Rfl:     Multiple Vitamin (MULTIVITAMINS PO), Take 1 tablet by mouth daily, Disp: , Rfl:     Allergies   Allergen Reactions    Naproxen Shortness Of Breath    Bee Venom Edema     Reaction Date: [id number]; Category:  Allergy;     Other      Pumpkin,apple, banana, fish       Social History   Past Surgical History:   Procedure Laterality Date    DENTAL SURGERY      TYMPANOSTOMY TUBE PLACEMENT Bilateral     last assessed 7/24/15     Family History   Problem Relation Age of Onset    Colon cancer Mother     Ovarian cancer Mother     No Known Problems Father     Allergies Family     Diabetes Family     Uterine cancer Family        Objective:  /78 (BP Location: Left arm, Patient Position: Sitting, Cuff Size: Standard)   Pulse 90   Temp 98 3 °F (36 8 °C) (Tympanic)   Ht 5' 2 17" (1 579 m)   Wt 54 2 kg (119 lb 6 4 oz)   SpO2 99%   BMI 21 72 kg/m²      Physical Exam   Constitutional: She is oriented to person, place, and time  She appears well-developed and well-nourished  Non-toxic appearance  She does not have a sickly appearance  She does not appear ill  No distress  HENT:   Head: Normocephalic and atraumatic  Right Ear: Hearing, tympanic membrane, external ear and ear canal normal    Left Ear: Hearing, tympanic membrane, external ear and ear canal normal    Nose: Nose normal    Mouth/Throat: Uvula is midline, oropharynx is clear and moist and mucous membranes are normal    Cardiovascular: Normal rate, regular rhythm and normal heart sounds  Pulmonary/Chest: Effort normal and breath sounds normal  No respiratory distress  She has no wheezes  Abdominal: Soft  Bowel sounds are normal  She exhibits no distension  There is no hepatosplenomegaly  There is generalized tenderness (Diffuse)  There is no rebound, no CVA tenderness, no tenderness at McBurney's point and negative Oakley's sign  Lymphadenopathy:     She has no cervical adenopathy  Neurological: She is alert and oriented to person, place, and time  Skin: Skin is warm and dry  Psychiatric: Her behavior is normal  Judgment and thought content normal  Her mood appears anxious  She is not agitated and not aggressive  She does not exhibit a depressed mood  She expresses no homicidal and no suicidal ideation  Patient visually anxious and upset during exam   She became very tearful throughout the exam   Nursing note and vitals reviewed

## 2018-09-24 NOTE — PATIENT INSTRUCTIONS
Will start an elimination diet  The 1st eliminate dairy for approximately two weeks  If her symptoms resolve that continue to eliminate dairy from her diet  If her symptoms persist he may rehab [address] and then would recommend eliminating week/gluten from her diet  Patient was given handout on gluten sources  If symptoms resolve then would recommend eliminating gluten/week from her diet  If your symptoms persist continue limitation until you can determine a source  Discussed with patient that there is likely an IBS component given her age and increased stress  She had do not feel that starting this medications would be beneficial at this time until her anxiety is better controlled  Recommend the patient has adequate source of fiber with fruits and vegetables  Additionally stays well hydrated  Patient is having some increased anxiety  She has been off of her BuSpar, Cymbalta, Xanax for a few months  Will restart BuSpar  She was previously taking 10 mg b i d  however she did have some drowsiness with that dosing  Will restart 5 mg b i d     Patient to return for follow-up in one month  She did have recent blood work to include a TS H  Do not feel necessary to repeat blood work at this time

## 2018-10-16 ENCOUNTER — HOSPITAL ENCOUNTER (EMERGENCY)
Facility: HOSPITAL | Age: 23
Discharge: HOME | End: 2018-10-16
Admitting: EMERGENCY MEDICINE
Payer: COMMERCIAL

## 2018-10-16 VITALS
BODY MASS INDEX: 22.06 KG/M2 | WEIGHT: 121.25 LBS | HEART RATE: 68 BPM | SYSTOLIC BLOOD PRESSURE: 107 MMHG | RESPIRATION RATE: 20 BRPM | TEMPERATURE: 98.3 F | OXYGEN SATURATION: 98 % | DIASTOLIC BLOOD PRESSURE: 67 MMHG

## 2018-10-16 DIAGNOSIS — F41.0 PANIC DISORDER: ICD-10-CM

## 2018-10-16 DIAGNOSIS — F41.9 ANXIETY: Primary | ICD-10-CM

## 2018-10-16 PROCEDURE — 99284 EMERGENCY DEPT VISIT MOD MDM: CPT

## 2018-10-16 RX ORDER — CLONAZEPAM 0.5 MG/1
0.5 TABLET ORAL ONCE
Status: COMPLETED | OUTPATIENT
Start: 2018-10-16 | End: 2018-10-16

## 2018-10-16 RX ORDER — CLONAZEPAM 0.5 MG/1
0.5 TABLET ORAL 3 TIMES DAILY
Qty: 16 TABLET | Refills: 0 | Status: SHIPPED | OUTPATIENT
Start: 2018-10-16 | End: 2018-10-26

## 2018-10-16 RX ORDER — CLONAZEPAM 0.5 MG/1
0.5 TABLET ORAL 3 TIMES DAILY
Qty: 16 TABLET | Refills: 0 | Status: SHIPPED | OUTPATIENT
Start: 2018-10-16 | End: 2018-10-16

## 2018-10-16 RX ADMIN — CLONAZEPAM 0.5 MG: 0.5 TABLET ORAL at 05:33

## 2018-10-16 NOTE — DISCHARGE INSTRUCTIONS
Anxiety   WHAT YOU SHOULD KNOW:   Anxiety is a condition that causes you to feel excessive worry, uneasiness, or fear  Family or work stress, smoking, caffeine, and alcohol can increase your risk for anxiety  Certain medicines or health conditions can also increase your risk  Anxiety may begin gradually, and can become a long-term condition if it is not managed or treated  AFTER YOU LEAVE:   Medicines:   · Medicines  can help you feel more calm and relaxed, and decrease your symptoms  · Take your medicine as directed  Contact your healthcare provider if you think your medicine is not helping or if you have side effects  Tell him if you are allergic to any medicine  Keep a list of the medicines, vitamins, and herbs you take  Include the amounts, and when and why you take them  Bring the list or the pill bottles to follow-up visits  Carry your medicine list with you in case of an emergency  Follow up with your healthcare provider within 2 weeks or as directed:  Write down your questions so you remember to ask them during your visits  Manage anxiety:   · Go to counseling as directed  Cognitive behavioral therapy can help you understand and change how you react to events that trigger your symptoms  · Find ways to manage your symptoms  Activities such as exercise, meditation, or listening to music can help you relax  · Practice deep breathing  Breathing can change how your body reacts to stress  Focus on taking slow, deep breaths several times a day, or during an anxiety attack  Breathe in through your nose, and out through your mouth  · Avoid caffeine  Caffeine can make your symptoms worse  Avoid foods or drinks that are meant to increase your energy level  · Limit or avoid alcohol  Ask your healthcare provider if alcohol is safe for you  You may not be able to drink alcohol if you take certain anxiety or depression medicines  Limit alcohol to 1 drink per day if you are a woman   Limit alcohol to 2 drinks per day if you are a man  A drink of alcohol is 12 ounces of beer, 5 ounces of wine, or 1½ ounces of liquor  Contact your healthcare provider if:   · Your symptoms get worse or do not get better with treatment  · You think your medicine may be causing side effects  · Your anxiety keeps you from doing your regular daily activities  · You have new symptoms since your last visit  · You have questions or concerns about your condition or care  Seek care immediately or call 911 if:   · You have chest pain, tightness, or heaviness that may spread to your shoulders, arms, jaw, neck, or back  · You feel like hurting yourself or someone else  · You feel dizzy, lightheaded, or faint  © 2014 [employer] is for End User's use only and may not be sold, redistributed or otherwise used for commercial purposes  All illustrations and images included in CareNotes® are the copyrighted property of A D [employer] M [employer]  or Derrick Horner  The above information is an  only  It is not intended as medical advice for individual conditions or treatments  Talk to your doctor, nurse or pharmacist before following any medical regimen to see if it is safe and effective for you

## 2018-10-16 NOTE — ED NOTES
Pt discharge instructions reviewed, Pt has no further questions at this time  Pt awake and alert, no signs of acute distress noted  Pt ambulated out of ED with a steady gait       Briseyda Haro RN  10/16/18 0084

## 2018-10-16 NOTE — ED PROVIDER NOTES
History  Chief Complaint   Patient presents with    Numbness     Pt reports b/l arm numbness and cheek numbness that started about 30 minutes ago  Reports hx anxiety  Pt reports numbness and tingling in b/l legs also  HPI   28-year-old female comes in today with total body numbness stated it started in the finger tips went all the way up problems went all the way down her body went all the way down to her toes, she also complained of abdominal pain with bloating and gas stating had chest feels uncomfortable stating she feels short of breath and it all started when she woke up approximately 2 hours ago  Patient has a history of anxiety stated Xanax helps her, she is taking buspirone 5 mg b i d  She used to take Cymbalta, is not seeing a psychiatrist at this time will need follow-up with them  Was last seen 09/05/2018 in the ED for abdominal pain was last seen 09/24 an office visit for abdominal pain and anxiety  Her abdominal symptoms sound like IBS associated with panic disorder  She denies any vomiting diarrhea and nausea at this time    Prior to Admission Medications   Prescriptions Last Dose Informant Patient Reported? Taking?    EPINEPHrine (EPIPEN 2-YURIY) 0 3 mg/0 3 mL SOAJ   Yes Yes   Sig: Inject 0 3 mL as directed   Multiple Vitamin (MULTIVITAMINS PO)   Yes Yes   Sig: Take 1 tablet by mouth daily   Sodium Oxybate (XYREM) 500 MG/ML SOLN   No Yes   Sig: Take 0 65 mL (325 mg total) by mouth 2 (two) times a day for 30 days Take at bedtime and then 2-3 hours later   busPIRone (BUSPAR) 5 mg tablet   No No   Sig: Take 1 tablet (5 mg total) by mouth 2 (two) times a day      Facility-Administered Medications: None       Past Medical History:   Diagnosis Date    Anxiety     Asthma 11/19/2009    Depression     last assessed 7/24/15    Disease of thyroid gland     History of behavior problem     Narcolepsy     Pneumonia     Reactive airway disease 05/13/2009       Past Surgical History:   Procedure Laterality Date    DENTAL SURGERY      TYMPANOSTOMY TUBE PLACEMENT Bilateral     last assessed 7/24/15       Family History   Problem Relation Age of Onset    Colon cancer Mother     Ovarian cancer Mother     No Known Problems Father     Allergies Family     Diabetes Family     Uterine cancer Family      I have reviewed and agree with the history as documented  Social History   Substance Use Topics    Smoking status: Never Smoker    Smokeless tobacco: Never Used    Alcohol use Yes      Comment: Social         Review of Systems   Constitutional: Negative for appetite change, diaphoresis, fatigue and fever  HENT: Negative for congestion, facial swelling, rhinorrhea, sneezing and tinnitus  Eyes: Negative for photophobia, pain and discharge  Respiratory: Positive for cough, chest tightness and shortness of breath  Negative for apnea  Cardiovascular: Positive for chest pain and palpitations  Negative for leg swelling  Gastrointestinal: Positive for vomiting  Negative for abdominal distention, abdominal pain, diarrhea and nausea  Endocrine: Negative for cold intolerance, polydipsia and polyphagia  Genitourinary: Negative for enuresis, frequency and hematuria  Musculoskeletal: Negative for arthralgias, gait problem and myalgias  Skin: Negative for color change and rash  Allergic/Immunologic: Negative for environmental allergies and food allergies  Neurological: Positive for weakness, numbness and headaches  Negative for syncope, speech difficulty and light-headedness  Hematological: Negative for adenopathy  Does not bruise/bleed easily  Psychiatric/Behavioral: Negative for behavioral problems, decreased concentration and hallucinations  The patient is nervous/anxious and is hyperactive  Physical Exam  Physical Exam   Constitutional: She is oriented to person, place, and time  She appears well-developed and well-nourished  No distress     HENT:   Head: Normocephalic and atraumatic  Mouth/Throat: No oropharyngeal exudate  Eyes: Pupils are equal, round, and reactive to light  Conjunctivae and EOM are normal    Neck: Normal range of motion  Neck supple  No JVD present  No tracheal deviation present  Cardiovascular: Normal rate, regular rhythm, normal heart sounds and intact distal pulses  Exam reveals no gallop and no friction rub  No murmur heard  Pulmonary/Chest: Effort normal and breath sounds normal  No respiratory distress  She has no wheezes  She has no rales  She exhibits no tenderness  Abdominal: Soft  Bowel sounds are normal  She exhibits no distension  There is no tenderness  There is no guarding  Musculoskeletal: Normal range of motion  She exhibits no edema, tenderness or deformity  Neurological: She is alert and oriented to person, place, and time  Skin: Skin is warm and dry  Capillary refill takes less than 2 seconds  She is not diaphoretic  No erythema  Psychiatric: Her mood appears anxious  Her speech is rapid and/or pressured  She is agitated  Nursing note and vitals reviewed        Vital Signs  ED Triage Vitals   Temperature Pulse Respirations Blood Pressure SpO2   10/16/18 0506 10/16/18 0455 10/16/18 0455 10/16/18 0455 10/16/18 0455   98 3 °F (36 8 °C) 89 22 133/87 99 %      Temp Source Heart Rate Source Patient Position - Orthostatic VS BP Location FiO2 (%)   10/16/18 0506 10/16/18 0455 10/16/18 0455 10/16/18 0455 --   Oral Monitor Lying Right arm       Pain Score       --                  Vitals:    10/16/18 0455 10/16/18 0542 10/16/18 0545 10/16/18 0600   BP: 133/87 107/67 107/67    Pulse: 89 77 72 68   Patient Position - Orthostatic VS: Lying Lying         Visual Acuity      ED Medications  Medications   clonazePAM (KlonoPIN) tablet 0 5 mg (0 5 mg Oral Given 10/16/18 0533)       Diagnostic Studies  Results Reviewed     None                 No orders to display              Procedures  Procedures       Phone Contacts  ED Phone Contact    ED Course                               MDM  CritCare Time    Disposition  Final diagnoses:   Anxiety   Panic disorder     Time reflects when diagnosis was documented in both MDM as applicable and the Disposition within this note     Time User Action Codes Description Comment    10/16/2018  5:53 AM Dimple Hagen Add [F41 9] Anxiety     10/16/2018  5:53 AM Alicia Venegas Add [F41 0] Panic disorder       ED Disposition     ED Disposition Condition Comment    Discharge  [address] [place] [address] Road discharge to home/self care  Condition at discharge: Stable        Follow-up Information     Follow up With Specialties Details Why David Venegas MD Internal Medicine In 2 days  [address] [facility]  [phone]            Patient's Medications   Discharge Prescriptions    CLONAZEPAM (KLONOPIN) 0 5 MG TABLET    Take 1 tablet (0 5 mg total) by mouth 3 (three) times a day for 10 days       Start Date: 10/16/2018End Date: 10/26/2018       Order Dose: 0 5 mg       Quantity: 16 tablet    Refills: 0     No discharge procedures on file      ED Provider  Electronically Signed by           Sarmad Mcneal PA-C  10/16/18 0625

## 2018-10-26 ENCOUNTER — OFFICE VISIT (OUTPATIENT)
Dept: INTERNAL MEDICINE CLINIC | Facility: CLINIC | Age: 23
End: 2018-10-26
Payer: COMMERCIAL

## 2018-10-26 VITALS
OXYGEN SATURATION: 99 % | TEMPERATURE: 98.6 F | HEART RATE: 62 BPM | WEIGHT: 117.8 LBS | RESPIRATION RATE: 18 BRPM | SYSTOLIC BLOOD PRESSURE: 100 MMHG | HEIGHT: 62 IN | BODY MASS INDEX: 21.68 KG/M2 | DIASTOLIC BLOOD PRESSURE: 60 MMHG

## 2018-10-26 DIAGNOSIS — K21.9 GASTROESOPHAGEAL REFLUX DISEASE, ESOPHAGITIS PRESENCE NOT SPECIFIED: ICD-10-CM

## 2018-10-26 DIAGNOSIS — R45.851 SUICIDAL THOUGHTS: ICD-10-CM

## 2018-10-26 DIAGNOSIS — F32.2 SEVERE DEPRESSION (HCC): Primary | ICD-10-CM

## 2018-10-26 PROCEDURE — 99204 OFFICE O/P NEW MOD 45 MIN: CPT | Performed by: INTERNAL MEDICINE

## 2018-10-26 RX ORDER — RANITIDINE 150 MG/1
150 TABLET ORAL 2 TIMES DAILY
Qty: 30 TABLET | Refills: 0 | Status: SHIPPED | OUTPATIENT
Start: 2018-10-26 | End: 2021-12-26

## 2018-10-26 NOTE — PROGRESS NOTES
Assessment/Plan:     Diagnoses and all orders for this visit:    Severe depression ([address] )  Comments:  pt contracts to safety, declines SSRI & requests f/u next week and  eval, ref provided  Orders:  -     Ambulatory referral to Psychiatry; Future    Suicidal thoughts  Comments:  denies plan, intent or previous attempt  contracts to safety, needs expedited psych appt  will have my office call on monday to help expedite pt's appt    Gastroesophageal reflux disease, esophagitis presence not specified  Comments:  trial of H2B, f/u in 1 week per pt's verbal contract  Orders:  -     ranitidine (ZANTAC) 150 mg tablet; Take 1 tablet (150 mg total) by mouth 2 (two) times a day      called pt's emergency contact "Ladonna Moore", at 4:56pm, rec'd VM and LM on VM for CB to my office  Will try again in 15-20 mins  Spent >45 mins with patient including >50% of time counseling/coordination of care  Called again and spoke to pt's partner(Kaley) who stated Yoana Le was dissatisfied with her appt with me and will not be coming back because she was not prescribed xanax  I reiterated to Ladonna Moore the same message I provided to Yoana Le that xanax does not treat depression and chronic anxiety  Ladonna Moore was upset at hearing this and stated that 'all medications are addictive'  Yoana Stafford called sanaz who stated it will be 2 mos before an appt is available  I advised to take this appt in 2 mos and my office will call to expedite Meenakshi's appt on Monday  Ladonna Moore declined this and states she and Yoana Le will no longer be coming to my office for their care  Ladonna Moore said Yoanaloulou Aragonley is safe and that she([place]) is safe as well(Kaley was a no show for her appt with me earlier this month  I advised Ladonna Moore and Yoana Le to seek a 2nd medical opinon and see psychiatrist & wished them both well and phone conversation ended  Subjective:      Patient ID: Barak Cochran is a 26 y o  female  HPI    New to practice here to establish care    Referred here by her partner(Kaley)  Hx of anxiety/depression for years, was seeing psychiatrist and prescribed cymbalta 60mg, buspar BID and xanax with some control of sx  Lost insurance and fell out of care  Seeing her previous PCP but wasn't being prescribed xanax and declined to go back  Hasn't taken any anti-anxiety medication for some time, lost 40+ lbs in last 1 year  Tearful and crying during appt today  Reports having harmful thoughts but would not act on them, no plan and 'it would hurt too many people'  She takes marijuana regularly to help her anxiety and appetite  GAD7 and PHQ9 score of 21 & 27  I advised [facility] that based on her score and her symptoms, she should go to ER and meet crisis team   [place] then confirmed with me that she will Not harm herself and 'just want to get coffee at [employer] and meet my partner at her work today  [place] wants to return to office next week for a follow up visit and 'just wants a referral to see a psychiatrist'  She declines to resume cymbalta or take any other SSRI  She requests xanax as it is 'the only thing that helps'  I discussed with her that xanax does not treat depression and chronic anxiety and its habit forming, dependence properties  [facility] verbalized understanding and contracted to f/u with me next week and make psychiatrist appointment  [address] if she does not get an appt in timely manner, she should contact my office to help expedite the appt  [facility] also verbally contracted with me to allow me to call her partner Jose Angel Cobb to discuss Meenakshi's visit today  [facility] and Jose Angel Cobb live today and with Kaley's parents  She also eats 1 meal per day on avg and was being treated by previous PCP for acid reflux  She requests an rx to treat this as it helped her in past    No other complaints      Family History   Problem Relation Age of Onset    Colon cancer Mother     Ovarian cancer Mother     No Known Problems Father     Allergies Family     Diabetes Family  Uterine cancer Family      Social History     Social History    Marital status: Single     Spouse name: N/A    Number of children: N/A    Years of education: N/A     Occupational History    Not on file  Social History Main Topics    Smoking status: Never Smoker    Smokeless tobacco: Never Used    Alcohol use Yes      Comment: Social     Drug use: Yes     Types: Marijuana    Sexual activity: Yes     Other Topics Concern    Not on file     Social History Narrative    Native language: English     Racial background:      Past Medical History:   Diagnosis Date    Anxiety     Asthma 11/19/2009    Depression     last assessed 7/24/15    Disease of thyroid gland     History of behavior problem     Narcolepsy     Pneumonia     Reactive airway disease 05/13/2009     Vitals:    10/26/18 1410   BP: 100/60   Pulse: 62   Resp: 18   Temp: 98 6 °F (37 °C)   TempSrc: Oral   SpO2: 99%   Weight: 53 4 kg (117 lb 12 8 oz)   Height: 5' 1 5" (1 562 m)     Body mass index is 21 9 kg/m²  Current Outpatient Prescriptions:     EPINEPHrine (EPIPEN 2-YURIY) 0 3 mg/0 3 mL SOAJ, Inject 0 3 mL as directed, Disp: , Rfl:     Multiple Vitamin (MULTIVITAMINS PO), Take 1 tablet by mouth daily, Disp: , Rfl:     Sodium Oxybate (XYREM) 500 MG/ML SOLN, Take 0 65 mL (325 mg total) by mouth 2 (two) times a day for 30 days Take at bedtime and then 2-3 hours later, Disp: 19 5 g, Rfl: 0    ranitidine (ZANTAC) 150 mg tablet, Take 1 tablet (150 mg total) by mouth 2 (two) times a day, Disp: 30 tablet, Rfl: 0  Allergies   Allergen Reactions    Naproxen Shortness Of Breath    Bee Venom Edema     Reaction Date: [id number]; Category: Allergy;     Other      Pumpkin,apple, banana, fish     Past Surgical History:   Procedure Laterality Date    DENTAL SURGERY      TYMPANOSTOMY TUBE PLACEMENT Bilateral     last assessed 7/24/15         Review of Systems   Constitutional: Negative for fever  HENT: Negative for congestion  Eyes: Negative for visual disturbance  Respiratory: Negative for shortness of breath  Cardiovascular: Negative for chest pain  Gastrointestinal: Negative for abdominal pain  Endocrine: Negative for polyuria  Genitourinary: Negative for dysuria  Musculoskeletal: Negative for arthralgias  Allergic/Immunologic: Negative for immunocompromised state  Neurological: Negative for headaches  Psychiatric/Behavioral: Positive for dysphoric mood  The patient is nervous/anxious  Objective:      /60   Pulse 62   Temp 98 6 °F (37 °C) (Oral)   Resp 18   Ht 5' 1 5" (1 562 m)   Wt 53 4 kg (117 lb 12 8 oz)   LMP 10/02/2018   SpO2 99%   BMI 21 90 kg/m²          Physical Exam   Constitutional: She appears well-developed and well-nourished  HENT:   Head: Normocephalic and atraumatic  Eyes: Pupils are equal, round, and reactive to light  Conjunctivae are normal    Cardiovascular: Normal rate, regular rhythm and normal heart sounds  No murmur heard  Pulmonary/Chest: Effort normal and breath sounds normal  She has no wheezes  She has no rales  Abdominal: Soft  Bowel sounds are normal  There is no tenderness  Musculoskeletal: She exhibits no edema  Neurological: She is alert  Psychiatric: Her mood appears anxious  She is slowed  She exhibits a depressed mood  She expresses no suicidal plans  & tearful during appt   Vitals reviewed          PHQ-9 Depression Screening    PHQ-9:    Frequency of the following problems over the past two weeks:       Little interest or pleasure in doing things:  3 - nearly every day  Feeling down, depressed, or hopeless:  3 - nearly every day  Trouble falling or staying asleep, or sleeping too much:  3 - nearly every day  Feeling tired or having little energy:  3 - nearly every day  Poor appetite or overeating:  3 - nearly every day  Feeling bad about yourself - or that you are a failure or have let yourself or your family down:  3 - nearly every day  Trouble concentrating on things, such as reading the newspaper or watching television:  3 - nearly every day  Moving or speaking so slowly that other people could have noticed  Or the opposite - being so fidgety or restless that you have been moving around a lot more than usual:  3 - nearly every day  Thoughts that you would be better off dead, or of hurting yourself in some way:  3 - nearly every day  PHQ-2 Score:  6  PHQ-9 Score:  27       RANDY-7 Flowsheet Screening      Most Recent Value   Over the last two weeks, how often have you been bothered by the following problems? Feeling nervous, anxious, or on edge  3   Not being able to stop or control worrying  3   Worrying too much about different things  3   Trouble relaxing   3   Being so restless that it's hard to sit still  3   Becoming easily annoyed or irritable   3   Feeling afraid as if something awful might happen  3   How difficult have these problems made it for you to do your work, take care of things at home, or get along with other people?    Extremely difficult   RANDY Score   21

## 2018-12-05 ENCOUNTER — TELEPHONE (OUTPATIENT)
Dept: SLEEP CENTER | Facility: CLINIC | Age: 23
End: 2018-12-05

## 2019-01-29 ENCOUNTER — HOSPITAL ENCOUNTER (EMERGENCY)
Facility: HOSPITAL | Age: 24
Discharge: HOME | End: 2019-01-30
Attending: EMERGENCY MEDICINE
Payer: COMMERCIAL

## 2019-01-29 ENCOUNTER — APPOINTMENT (EMERGENCY)
Dept: CT IMAGING | Facility: HOSPITAL | Age: 24
End: 2019-01-29
Payer: COMMERCIAL

## 2019-01-29 DIAGNOSIS — G43.009 ATYPICAL MIGRAINE: Primary | ICD-10-CM

## 2019-01-29 DIAGNOSIS — R79.89 ELEVATED TSH: ICD-10-CM

## 2019-01-29 DIAGNOSIS — F41.9 ANXIETY: ICD-10-CM

## 2019-01-29 DIAGNOSIS — K29.70 GASTRITIS: ICD-10-CM

## 2019-01-29 LAB
ALBUMIN SERPL BCP-MCNC: 3.9 G/DL (ref 3.5–5)
ALP SERPL-CCNC: 54 U/L (ref 46–116)
ALT SERPL W P-5'-P-CCNC: 25 U/L (ref 12–78)
AMPHETAMINES SERPL QL SCN: NEGATIVE
ANION GAP SERPL CALCULATED.3IONS-SCNC: 9 MMOL/L (ref 4–13)
AST SERPL W P-5'-P-CCNC: 17 U/L (ref 5–45)
BARBITURATES UR QL: NEGATIVE
BASOPHILS # BLD AUTO: 0.03 THOUSANDS/ΜL (ref 0–0.1)
BASOPHILS NFR BLD AUTO: 1 % (ref 0–1)
BENZODIAZ UR QL: NEGATIVE
BILIRUB SERPL-MCNC: 0.2 MG/DL (ref 0.2–1)
BILIRUB UR QL STRIP: NEGATIVE
BUN SERPL-MCNC: 15 MG/DL (ref 5–25)
CALCIUM SERPL-MCNC: 9 MG/DL (ref 8.3–10.1)
CHLORIDE SERPL-SCNC: 103 MMOL/L (ref 100–108)
CLARITY UR: CLEAR
CO2 SERPL-SCNC: 27 MMOL/L (ref 21–32)
COCAINE UR QL: NEGATIVE
COLOR UR: YELLOW
CREAT SERPL-MCNC: 0.96 MG/DL (ref 0.6–1.3)
EOSINOPHIL # BLD AUTO: 0.09 THOUSAND/ΜL (ref 0–0.61)
EOSINOPHIL NFR BLD AUTO: 2 % (ref 0–6)
ERYTHROCYTE [DISTWIDTH] IN BLOOD BY AUTOMATED COUNT: 12.9 % (ref 11.6–15.1)
EXT PREG TEST URINE: NEGATIVE
GFR SERPL CREATININE-BSD FRML MDRD: 85 ML/MIN/1.73SQ M
GLUCOSE SERPL-MCNC: 94 MG/DL (ref 65–140)
GLUCOSE UR STRIP-MCNC: NEGATIVE MG/DL
HCT VFR BLD AUTO: 39.3 % (ref 34.8–46.1)
HGB BLD-MCNC: 13 G/DL (ref 11.5–15.4)
HGB UR QL STRIP.AUTO: NEGATIVE
IMM GRANULOCYTES # BLD AUTO: 0.01 THOUSAND/UL (ref 0–0.2)
IMM GRANULOCYTES NFR BLD AUTO: 0 % (ref 0–2)
KETONES UR STRIP-MCNC: NEGATIVE MG/DL
LEUKOCYTE ESTERASE UR QL STRIP: NEGATIVE
LIPASE SERPL-CCNC: 266 U/L (ref 73–393)
LYMPHOCYTES # BLD AUTO: 3.08 THOUSANDS/ΜL (ref 0.6–4.47)
LYMPHOCYTES NFR BLD AUTO: 50 % (ref 14–44)
MCH RBC QN AUTO: 30.1 PG (ref 26.8–34.3)
MCHC RBC AUTO-ENTMCNC: 33.1 G/DL (ref 31.4–37.4)
MCV RBC AUTO: 91 FL (ref 82–98)
METHADONE UR QL: NEGATIVE
MONOCYTES # BLD AUTO: 0.41 THOUSAND/ΜL (ref 0.17–1.22)
MONOCYTES NFR BLD AUTO: 7 % (ref 4–12)
NEUTROPHILS # BLD AUTO: 2.39 THOUSANDS/ΜL (ref 1.85–7.62)
NEUTS SEG NFR BLD AUTO: 40 % (ref 43–75)
NITRITE UR QL STRIP: NEGATIVE
NRBC BLD AUTO-RTO: 0 /100 WBCS
OPIATES UR QL SCN: NEGATIVE
PCP UR QL: NEGATIVE
PH UR STRIP.AUTO: 7.5 [PH] (ref 4.5–8)
PLATELET # BLD AUTO: 235 THOUSANDS/UL (ref 149–390)
PMV BLD AUTO: 10.9 FL (ref 8.9–12.7)
POTASSIUM SERPL-SCNC: 4 MMOL/L (ref 3.5–5.3)
PROT SERPL-MCNC: 6.9 G/DL (ref 6.4–8.2)
PROT UR STRIP-MCNC: NEGATIVE MG/DL
RBC # BLD AUTO: 4.32 MILLION/UL (ref 3.81–5.12)
SODIUM SERPL-SCNC: 139 MMOL/L (ref 136–145)
SP GR UR STRIP.AUTO: 1.02 (ref 1–1.03)
THC UR QL: NEGATIVE
TSH SERPL DL<=0.05 MIU/L-ACNC: 5.7 UIU/ML (ref 0.36–3.74)
UROBILINOGEN UR QL STRIP.AUTO: 0.2 E.U./DL
WBC # BLD AUTO: 6.01 THOUSAND/UL (ref 4.31–10.16)

## 2019-01-29 PROCEDURE — 81025 URINE PREGNANCY TEST: CPT | Performed by: EMERGENCY MEDICINE

## 2019-01-29 PROCEDURE — 80307 DRUG TEST PRSMV CHEM ANLYZR: CPT | Performed by: EMERGENCY MEDICINE

## 2019-01-29 PROCEDURE — 85025 COMPLETE CBC W/AUTO DIFF WBC: CPT | Performed by: EMERGENCY MEDICINE

## 2019-01-29 PROCEDURE — 84443 ASSAY THYROID STIM HORMONE: CPT | Performed by: EMERGENCY MEDICINE

## 2019-01-29 PROCEDURE — 83690 ASSAY OF LIPASE: CPT | Performed by: EMERGENCY MEDICINE

## 2019-01-29 PROCEDURE — 80053 COMPREHEN METABOLIC PANEL: CPT | Performed by: EMERGENCY MEDICINE

## 2019-01-29 PROCEDURE — 81003 URINALYSIS AUTO W/O SCOPE: CPT

## 2019-01-29 PROCEDURE — 99284 EMERGENCY DEPT VISIT MOD MDM: CPT

## 2019-01-29 PROCEDURE — 70450 CT HEAD/BRAIN W/O DYE: CPT

## 2019-01-29 PROCEDURE — 84439 ASSAY OF FREE THYROXINE: CPT | Performed by: EMERGENCY MEDICINE

## 2019-01-29 PROCEDURE — 36415 COLL VENOUS BLD VENIPUNCTURE: CPT | Performed by: EMERGENCY MEDICINE

## 2019-01-29 RX ORDER — FAMOTIDINE 20 MG/1
20 TABLET, FILM COATED ORAL ONCE
Status: COMPLETED | OUTPATIENT
Start: 2019-01-29 | End: 2019-01-29

## 2019-01-29 RX ORDER — LORAZEPAM 1 MG/1
1 TABLET ORAL ONCE
Status: COMPLETED | OUTPATIENT
Start: 2019-01-29 | End: 2019-01-29

## 2019-01-29 RX ADMIN — FAMOTIDINE 20 MG: 20 TABLET ORAL at 22:15

## 2019-01-29 RX ADMIN — LORAZEPAM 1 MG: 1 TABLET ORAL at 22:15

## 2019-01-30 VITALS
WEIGHT: 115.96 LBS | TEMPERATURE: 98.4 F | SYSTOLIC BLOOD PRESSURE: 115 MMHG | OXYGEN SATURATION: 99 % | RESPIRATION RATE: 20 BRPM | HEART RATE: 68 BPM | DIASTOLIC BLOOD PRESSURE: 72 MMHG | BODY MASS INDEX: 21.56 KG/M2

## 2019-01-30 LAB — T4 FREE SERPL-MCNC: 0.94 NG/DL (ref 0.76–1.46)

## 2019-01-30 RX ORDER — FAMOTIDINE 20 MG/1
20 TABLET, FILM COATED ORAL 2 TIMES DAILY
Qty: 30 TABLET | Refills: 0 | Status: SHIPPED | OUTPATIENT
Start: 2019-01-30 | End: 2021-12-26

## 2019-01-30 RX ORDER — LORAZEPAM 0.5 MG/1
.5-1 TABLET ORAL 2 TIMES DAILY PRN
Qty: 15 TABLET | Refills: 0 | Status: SHIPPED | OUTPATIENT
Start: 2019-01-30

## 2019-01-30 NOTE — ED PROVIDER NOTES
History  Chief Complaint   Patient presents with    Headache     PT reports "occular auras for weeks and today I started with vertigo and confusion and pressure over my whole head with tingly feelings in hands and face" PT c/o of "head feeling weird"       History provided by:  Patient and significant other   used: No    Headache   Associated symptoms: abdominal pain, dizziness and photophobia    Associated symptoms: no back pain, no nausea, no neck pain, no vomiting and no weakness     61-year-old female with a history of anxiety, ocular migraines, thyroid disorder, chronic abdominal pain presented for evaluation of becoming more 'spacey' today per girlfriend  Also reports fuzzy visual changes similar to ocular migraine but without headache  Had vertigo which has improved  She is very anxious, has had increased stress lately  She is a fairly poor historian and her girlfriend provides most of the history  She is not taking any medications at the moment  She had been prescribed Zantac for her chronic epigastric pain but has not been taking anything  Describes the pain is nearly constant and worse with eating grains another certain foods  She was very nervous/paranoid about examination here  Has reproducible epigastric tenderness without rebound or guarding  Oropharynx is moist   She has no focal neurologic deficits  She has not had any previous neuro imaging and had been scheduled for an MRI but canceled due to anxiety  Will plan CT head, labs, urine and will re-evaluate  Prior to Admission Medications   Prescriptions Last Dose Informant Patient Reported? Taking?    EPINEPHrine (EPIPEN 2-YURIY) 0 3 mg/0 3 mL SOAJ   Yes Yes   Sig: Inject 0 3 mL as directed   Multiple Vitamin (MULTIVITAMINS PO)   Yes Yes   Sig: Take 1 tablet by mouth daily   Sodium Oxybate (XYREM) 500 MG/ML SOLN   No No   Sig: Take 0 65 mL (325 mg total) by mouth 2 (two) times a day for 30 days Take at bedtime and then 2-3 hours later   ranitidine (ZANTAC) 150 mg tablet Not Taking at Unknown time  No No   Sig: Take 1 tablet (150 mg total) by mouth 2 (two) times a day   Patient not taking: Reported on 1/29/2019       Facility-Administered Medications: None       Past Medical History:   Diagnosis Date    Anxiety     Asthma 11/19/2009    Depression     last assessed 7/24/15    Disease of thyroid gland     History of behavior problem     Narcolepsy     Pneumonia     Reactive airway disease 05/13/2009       Past Surgical History:   Procedure Laterality Date    DENTAL SURGERY      TYMPANOSTOMY TUBE PLACEMENT Bilateral     last assessed 7/24/15       Family History   Problem Relation Age of Onset    Colon cancer Mother     Ovarian cancer Mother     No Known Problems Father     Allergies Family     Diabetes Family     Uterine cancer Family      I have reviewed and agree with the history as documented  Social History   Substance Use Topics    Smoking status: Never Smoker    Smokeless tobacco: Never Used    Alcohol use Yes      Comment: Social         Review of Systems   Constitutional: Positive for activity change  Eyes: Positive for photophobia and visual disturbance  Gastrointestinal: Positive for abdominal pain  Negative for nausea and vomiting  Musculoskeletal: Negative for back pain and neck pain  Skin: Negative for color change and rash  Neurological: Positive for dizziness and headaches  Negative for weakness  Psychiatric/Behavioral: Positive for decreased concentration  Negative for behavioral problems  The patient is nervous/anxious  All other systems reviewed and are negative  Physical Exam  Physical Exam   Constitutional: She is oriented to person, place, and time  She appears well-developed and well-nourished  HENT:   Head: Normocephalic  Mouth/Throat: Oropharynx is clear and moist    Eyes: Pupils are equal, round, and reactive to light   Conjunctivae and EOM are normal    Neck: Normal range of motion  Neck supple  Cardiovascular: Normal rate, regular rhythm and normal heart sounds  Pulmonary/Chest: Effort normal and breath sounds normal    Abdominal: Soft  She exhibits no distension  There is no rebound and no guarding  Mild to moderate epigastric tenderness  Musculoskeletal: Normal range of motion  She exhibits no edema  Neurological: She is alert and oriented to person, place, and time  No cranial nerve deficit or sensory deficit  She exhibits normal muscle tone  Coordination normal    Skin: Skin is warm and dry  Psychiatric:   Anxious/paranoid  Nursing note and vitals reviewed  Vital Signs  ED Triage Vitals   Temperature Pulse Respirations Blood Pressure SpO2   01/29/19 2159 01/29/19 2157 01/29/19 2157 01/29/19 2157 01/29/19 2157   98 4 °F (36 9 °C) 74 18 120/74 98 %      Temp Source Heart Rate Source Patient Position - Orthostatic VS BP Location FiO2 (%)   01/29/19 2159 01/29/19 2157 01/29/19 2157 01/29/19 2157 --   Oral Monitor Lying Right arm       Pain Score       01/30/19 0004       No Pain           Vitals:    01/29/19 2157 01/30/19 0004   BP: 120/74 115/72   Pulse: 74 68   Patient Position - Orthostatic VS: Lying Sitting       Visual Acuity      ED Medications  Medications   LORazepam (ATIVAN) tablet 1 mg (1 mg Oral Given 1/29/19 2215)   famotidine (PEPCID) tablet 20 mg (20 mg Oral Given 1/29/19 2215)       Diagnostic Studies  Results Reviewed     Procedure Component Value Units Date/Time    T4, free [[id number]] Collected:  01/29/19 2325    Lab Status:   In process Specimen:  Blood from Arm, Left Updated:  01/30/19 0004    Lipase [[id number]]  (Normal) Collected:  01/29/19 2325    Lab Status:  Final result Specimen:  Blood from Arm, Left Updated:  01/29/19 2357     Lipase 266 u/L     TSH [[id number]]  (Abnormal) Collected:  01/29/19 2325    Lab Status:  Final result Specimen:  Blood from Arm, Left Updated:  01/29/19 2357     TSH 3RD GENERATON 5 698 (H) uIU/mL Narrative:         Patients undergoing fluorescein dye angiography may retain small amounts of fluorescein in the body for 48-72 hours post procedure  Samples containing fluorescein can produce falsely depressed TSH values  If the patient had this procedure,a specimen should be resubmitted post fluorescein clearance  The recommended reference ranges for TSH during pregnancy are as follows:  First trimester 0 1 to 2 5 uIU/mL  Second trimester  0 2 to 3 0 uIU/mL  Third trimester 0 3 to 3 0 uIU/m      Comprehensive metabolic panel [[id number]] Collected:  01/29/19 2325    Lab Status:  Final result Specimen:  Blood from Arm, Left Updated:  01/29/19 2348     Sodium 139 mmol/L      Potassium 4 0 mmol/L      Chloride 103 mmol/L      CO2 27 mmol/L      ANION GAP 9 mmol/L      BUN 15 mg/dL      Creatinine 0 96 mg/dL      Glucose 94 mg/dL      Calcium 9 0 mg/dL      AST 17 U/L      ALT 25 U/L      Alkaline Phosphatase 54 U/L      Total Protein 6 9 g/dL      Albumin 3 9 g/dL      Total Bilirubin 0 20 mg/dL      eGFR 85 ml/min/1 73sq m     Narrative:         National Kidney Disease Education Program recommendations are as follows:  GFR calculation is accurate only with a steady state creatinine  Chronic Kidney disease less than 60 ml/min/1 73 sq  meters  Kidney failure less than 15 ml/min/1 73 sq  meters      CBC and differential [[id number]]  (Abnormal) Collected:  01/29/19 2325    Lab Status:  Final result Specimen:  Blood from Arm, Left Updated:  01/29/19 2332     WBC 6 01 Thousand/uL      RBC 4 32 Million/uL      Hemoglobin 13 0 g/dL      Hematocrit 39 3 %      MCV 91 fL      MCH 30 1 pg      MCHC 33 1 g/dL      RDW 12 9 %      MPV 10 9 fL      Platelets 573 Thousands/uL      nRBC 0 /100 WBCs      Neutrophils Relative 40 (L) %      Immat GRANS % 0 %      Lymphocytes Relative 50 (H) %      Monocytes Relative 7 %      Eosinophils Relative 2 %      Basophils Relative 1 %      Neutrophils Absolute 2 39 Thousands/µL Immature Grans Absolute 0 01 Thousand/uL      Lymphocytes Absolute 3 08 Thousands/µL      Monocytes Absolute 0 41 Thousand/µL      Eosinophils Absolute 0 09 Thousand/µL      Basophils Absolute 0 03 Thousands/µL     Rapid drug screen, urine [[id number]]  (Normal) Collected:  01/29/19 2246    Lab Status:  Final result Specimen:  Urine from Urine, Clean Catch Updated:  01/29/19 2307     Amph/Meth UR Negative     Barbiturate Ur Negative     Benzodiazepine Urine Negative     Cocaine Urine Negative     Methadone Urine Negative     Opiate Urine Negative     PCP Ur Negative     THC Urine Negative    Narrative:         FOR MEDICAL PURPOSES ONLY  IF CONFIRMATION NEEDED PLEASE CONTACT THE LAB WITHIN 5 DAYS  Drug Screen Cutoff Levels:  AMPHETAMINE/METHAMPHETAMINES  1000 ng/mL  BARBITURATES     200 ng/mL  BENZODIAZEPINES     200 ng/mL  COCAINE      300 ng/mL  METHADONE      300 ng/mL  OPIATES      300 ng/mL  PHENCYCLIDINE     25 ng/mL  THC       50 ng/mL    POCT pregnancy, urine [[id number]]  (Normal) Resulted:  01/29/19 2248    Lab Status:  Final result Updated:  01/29/19 2248     EXT PREG TEST UR (Ref: Negative) negative    ED Urine Macroscopic [[id number]] Collected:  01/29/19 2247    Lab Status:  Final result Specimen:  Urine Updated:  01/29/19 2245     Color, UA Yellow     Clarity, UA Clear     pH, UA 7 5     Leukocytes, UA Negative     Nitrite, UA Negative     Protein, UA Negative mg/dl      Glucose, UA Negative mg/dl      Ketones, UA Negative mg/dl      Urobilinogen, UA 0 2 E U /dl      Bilirubin, UA Negative     Blood, UA Negative     Specific Gravity, UA 1 020    Narrative:       CLINITEK RESULT                 CT head without contrast   Final Result by Donal Kraus MD (01/29 2323)      No acute intracranial abnormality                    Workstation performed: [id number]                    Procedures  Procedures       Phone Contacts  ED Phone Contact    ED Course                               MDM  Number of Diagnoses or Management Options  Anxiety: established and worsening  Atypical migraine: established and worsening  Elevated TSH: new and requires workup  Gastritis: new and requires workup  Diagnosis management comments: 62-year-old male history of anxiety, migraines presented after having episode of some vertigo and being spaced out with some associated visual disturbances similar to ocular migraines that she normally gets  She did not developed a headache  She has been more significantly anxious due to life situations lately  She has ongoing epigastric pain worse with eating certain foods  Her neurologic exam was normal   CT head normal   Her labs or unremarkable other than a mildly elevated TSH  She does have a history of thyroid disease  No medications  T4 level pending  She has follow-up with a new family physician next week  Patient had some symptomatic improvement with Ativan here  Given a short-term p r n  prescription  Also advised continuing an H2 blocker for gastritis symptoms         Amount and/or Complexity of Data Reviewed  Clinical lab tests: ordered and reviewed  Tests in the radiology section of CPT®: ordered and reviewed  Obtain history from someone other than the patient: yes    Patient Progress  Patient progress: improved      Disposition  Final diagnoses:   Atypical migraine   Anxiety   Elevated TSH   Gastritis     Time reflects when diagnosis was documented in both MDM as applicable and the Disposition within this note     Time User Action Codes Description Comment    1/30/2019 12:46 AM Alberto MADDEN Add [G43 009] Atypical migraine     1/30/2019 12:46 AM Alberto MADDEN Add [F41 9] Anxiety     1/30/2019 12:47 AM Sunday Vinita Add [R79 89] Elevated TSH     1/30/2019 12:47 AM Sunday [place]a Add [K29 70] Gastritis       ED Disposition     ED Disposition Condition Date/Time Comment    Discharge  Wed Jan 30, 2019 12:47 AM Lyudmila Navarro discharge to home/self care     Condition at discharge: Good        Follow-up Information     Follow up With Specialties Details Why Contact Info Additional Information    Your family doctor         [facility] Emergency [facility]  If symptoms worsen [address]  [facility], [address], [place], [place], [address]          Discharge Medication List as of 1/30/2019 12:49 AM      START taking these medications    Details   famotidine (PEPCID) 20 mg tablet Take 1 tablet (20 mg total) by mouth 2 (two) times a day, Starting Wed 1/30/2019, Print      LORazepam (ATIVAN) 0 5 mg tablet Take 1-2 tablets (0 5-1 mg total) by mouth 2 (two) times a day as needed for anxiety, Starting Wed 1/30/2019, Print         CONTINUE these medications which have NOT CHANGED    Details   EPINEPHrine (EPIPEN 2-YURIY) 0 3 mg/0 3 mL SOAJ Inject 0 3 mL as directed, Starting Wed 8/16/2017, Historical Med      Multiple Vitamin (MULTIVITAMINS PO) Take 1 tablet by mouth daily, Historical Med      ranitidine (ZANTAC) 150 mg tablet Take 1 tablet (150 mg total) by mouth 2 (two) times a day, Starting Fri 10/26/2018, Normal      Sodium Oxybate (XYREM) 500 MG/ML SOLN Take 0 65 mL (325 mg total) by mouth 2 (two) times a day for 30 days Take at bedtime and then 2-3 hours later, Starting Wed 9/5/2018, Until Fri 10/26/2018, Normal           No discharge procedures on file      ED Provider  Electronically Signed by           Candance Abu, MD  01/30/19 6571

## 2019-01-30 NOTE — DISCHARGE INSTRUCTIONS
Anxiety   WHAT YOU NEED TO KNOW:   Anxiety is a condition that causes you to feel extremely worried or nervous  The feelings are so strong that they can cause problems with your daily activities or sleep  Anxiety may be triggered by something you fear, or it may happen without a cause  Family or work stress, smoking, caffeine, and alcohol can increase your risk for anxiety  Certain medicines or health conditions can also increase your risk  Anxiety can become a long-term condition if it is not managed or treated  DISCHARGE INSTRUCTIONS:   Call 911 if:   · You have chest pain, tightness, or heaviness that may spread to your shoulders, arms, jaw, neck, or back  · You feel like hurting yourself or someone else  Contact your healthcare provider if:   · Your symptoms get worse or do not get better with treatment  · Your anxiety keeps you from doing your regular daily activities  · You have new symptoms since your last visit  · You have questions or concerns about your condition or care  Medicines:   · Medicines  may be given to help you feel more calm and relaxed, and decrease your symptoms  · Take your medicine as directed  Contact your healthcare provider if you think your medicine is not helping or if you have side effects  Tell him of her if you are allergic to any medicine  Keep a list of the medicines, vitamins, and herbs you take  Include the amounts, and when and why you take them  Bring the list or the pill bottles to follow-up visits  Carry your medicine list with you in case of an emergency  Follow up with your healthcare provider within 2 weeks or as directed:  Write down your questions so you remember to ask them during your visits  Manage anxiety:   · Talk to someone about your anxiety  Your healthcare provider may suggest counseling  Cognitive behavioral therapy can help you understand and change how you react to events that trigger your symptoms   You might feel more comfortable talking with a friend or family member about your anxiety  Choose someone you know will be supportive and encouraging  · Find ways to relax  Activities such as exercise, meditation, or listening to music can help you relax  Spend time with friends, or do things you enjoy  · Practice deep breathing  Deep breathing can help you relax when you feel anxious  Focus on taking slow, deep breaths several times a day, or during an anxiety attack  Breathe in through your nose and out through your mouth  · Create a regular sleep routine  Regular sleep can help you feel calmer during the day  Go to sleep and wake up at the same times every day  Do not watch television or use the computer right before bed  Your room should be comfortable, dark, and quiet  · Eat a variety of healthy foods  Healthy foods include fruits, vegetables, low-fat dairy products, lean meats, fish, whole-grain breads, and cooked beans  Healthy foods can help you feel less anxious and have more energy  · Exercise regularly  Exercise can increase your energy level  Exercise may also lift your mood and help you sleep better  Your healthcare provider can help you create an exercise plan  · Do not smoke  Nicotine and other chemicals in cigarettes and cigars can increase anxiety  Ask your healthcare provider for information if you currently smoke and need help to quit  E-cigarettes or smokeless tobacco still contain nicotine  Talk to your healthcare provider before you use these products  · Do not have caffeine  Caffeine can make your symptoms worse  Do not have foods or drinks that are meant to increase your energy level  · Limit or do not drink alcohol  Ask your healthcare provider if alcohol is safe for you  You may not be able to drink alcohol if you take certain anxiety or depression medicines  Limit alcohol to 1 drink per day if you are a woman  Limit alcohol to 2 drinks per day if you are a man   A drink of alcohol is 12 ounces of beer, 5 ounces of wine, or 1½ ounces of liquor  · Do not use drugs  Drugs can make your anxiety worse  It can also make anxiety hard to manage  Talk to your healthcare provider if you use drugs and want help to quit  © 2017 [employer] Information is for End User's use only and may not be sold, redistributed or otherwise used for commercial purposes  All illustrations and images included in CareNotes® are the copyrighted property of [employer]  or [employer]ner  The above information is an  only  It is not intended as medical advice for individual conditions or treatments  Talk to your doctor, nurse or pharmacist before following any medical regimen to see if it is safe and effective for you  Gastritis   WHAT YOU NEED TO KNOW:   Gastritis is inflammation or irritation of the lining of your stomach  DISCHARGE INSTRUCTIONS:   Call 911 for any of the following:   · You develop chest pain or shortness of breath  Return to the emergency department if:   · You vomit blood  · You have black or bloody bowel movements  · You have severe stomach or back pain  Contact your healthcare provider if:   · You have a fever  · You have new or worsening symptoms, even after treatment  · You have questions or concerns about your condition or care  Medicines:   · Medicines  may be given to help treat a bacterial infection or decrease stomach acid  · Take your medicine as directed  Contact your healthcare provider if you think your medicine is not helping or if you have side effects  Tell him or her if you are allergic to any medicine  Keep a list of the medicines, vitamins, and herbs you take  Include the amounts, and when and why you take them  Bring the list or the pill bottles to follow-up visits  Carry your medicine list with you in case of an emergency  Manage or prevent gastritis:   · Do not smoke    Nicotine and other chemicals in cigarettes and cigars can make your symptoms worse and cause lung damage  Ask your healthcare provider for information if you currently smoke and need help to quit  E-cigarettes or smokeless tobacco still contain nicotine  Talk to your healthcare provider before you use these products  · Do not drink alcohol  Alcohol can prevent healing and make your gastritis worse  Talk to your healthcare provider if you need help to stop drinking  · Do not take NSAIDs or aspirin unless directed  These and similar medicines can cause irritation  If your healthcare provider says it is okay to take NSAIDs, take them with food  · Do not eat foods that cause irritation  Foods such as oranges and salsa can cause burning or pain  Eat a variety of healthy foods  Examples include fruits (not citrus), vegetables, low-fat dairy products, beans, whole-grain breads, and lean meats and fish  Try to eat small meals, and drink water with your meals  Do not eat for at least 3 hours before you go to bed  · Find ways to relax and decrease stress  Stress can increase stomach acid and make gastritis worse  Activities such as yoga, meditation, or listening to music can help you relax  Spend time with friends, or do things you enjoy  Follow up with your healthcare provider as directed: You may need ongoing tests or treatment, or referral to a gastroenterologist  Write down your questions so you remember to ask them during your visits  © 2017 [employer]  Information is for End User's use only and may not be sold, redistributed or otherwise used for commercial purposes  All illustrations and images included in CareNotes® are the copyrighted property of [employer]  or [employer] Horner  The above information is an  only  It is not intended as medical advice for individual conditions or treatments   Talk to your doctor, nurse or pharmacist before following any medical regimen to see if it is safe and effective for you

## 2019-06-12 ENCOUNTER — HOSPITAL ENCOUNTER (EMERGENCY)
Facility: HOSPITAL | Age: 24
Discharge: HOME | End: 2019-06-13
Attending: EMERGENCY MEDICINE
Payer: COMMERCIAL

## 2019-06-12 ENCOUNTER — APPOINTMENT (EMERGENCY)
Dept: CT IMAGING | Facility: HOSPITAL | Age: 24
End: 2019-06-12
Payer: COMMERCIAL

## 2019-06-12 DIAGNOSIS — K52.9 COLITIS: Primary | ICD-10-CM

## 2019-06-12 LAB
BASOPHILS # BLD AUTO: 0.05 THOUSANDS/ΜL (ref 0–0.1)
BASOPHILS NFR BLD AUTO: 1 % (ref 0–1)
BILIRUB UR QL STRIP: NEGATIVE
CLARITY UR: CLEAR
COLOR UR: YELLOW
EOSINOPHIL # BLD AUTO: 0.07 THOUSAND/ΜL (ref 0–0.61)
EOSINOPHIL NFR BLD AUTO: 1 % (ref 0–6)
ERYTHROCYTE [DISTWIDTH] IN BLOOD BY AUTOMATED COUNT: 12.1 % (ref 11.6–15.1)
EXT PREG TEST URINE: NEGATIVE
EXT. CONTROL ED NAV: NORMAL
GLUCOSE UR STRIP-MCNC: NEGATIVE MG/DL
HCT VFR BLD AUTO: 39.5 % (ref 34.8–46.1)
HGB BLD-MCNC: 13.1 G/DL (ref 11.5–15.4)
HGB UR QL STRIP.AUTO: NEGATIVE
IMM GRANULOCYTES # BLD AUTO: 0.01 THOUSAND/UL (ref 0–0.2)
IMM GRANULOCYTES NFR BLD AUTO: 0 % (ref 0–2)
KETONES UR STRIP-MCNC: NEGATIVE MG/DL
LEUKOCYTE ESTERASE UR QL STRIP: NEGATIVE
LYMPHOCYTES # BLD AUTO: 2.97 THOUSANDS/ΜL (ref 0.6–4.47)
LYMPHOCYTES NFR BLD AUTO: 48 % (ref 14–44)
MCH RBC QN AUTO: 29.8 PG (ref 26.8–34.3)
MCHC RBC AUTO-ENTMCNC: 33.2 G/DL (ref 31.4–37.4)
MCV RBC AUTO: 90 FL (ref 82–98)
MONOCYTES # BLD AUTO: 0.59 THOUSAND/ΜL (ref 0.17–1.22)
MONOCYTES NFR BLD AUTO: 9 % (ref 4–12)
NEUTROPHILS # BLD AUTO: 2.58 THOUSANDS/ΜL (ref 1.85–7.62)
NEUTS SEG NFR BLD AUTO: 41 % (ref 43–75)
NITRITE UR QL STRIP: NEGATIVE
NRBC BLD AUTO-RTO: 0 /100 WBCS
PH UR STRIP.AUTO: 7 [PH] (ref 4.5–8)
PLATELET # BLD AUTO: 268 THOUSANDS/UL (ref 149–390)
PMV BLD AUTO: 10.7 FL (ref 8.9–12.7)
PROT UR STRIP-MCNC: NEGATIVE MG/DL
RBC # BLD AUTO: 4.39 MILLION/UL (ref 3.81–5.12)
SP GR UR STRIP.AUTO: 1.01 (ref 1–1.03)
UROBILINOGEN UR QL STRIP.AUTO: 0.2 E.U./DL
WBC # BLD AUTO: 6.27 THOUSAND/UL (ref 4.31–10.16)

## 2019-06-12 PROCEDURE — 80053 COMPREHEN METABOLIC PANEL: CPT | Performed by: EMERGENCY MEDICINE

## 2019-06-12 PROCEDURE — 74177 CT ABD & PELVIS W/CONTRAST: CPT

## 2019-06-12 PROCEDURE — 85025 COMPLETE CBC W/AUTO DIFF WBC: CPT | Performed by: EMERGENCY MEDICINE

## 2019-06-12 PROCEDURE — 83690 ASSAY OF LIPASE: CPT | Performed by: EMERGENCY MEDICINE

## 2019-06-12 PROCEDURE — 36415 COLL VENOUS BLD VENIPUNCTURE: CPT | Performed by: EMERGENCY MEDICINE

## 2019-06-12 PROCEDURE — 81025 URINE PREGNANCY TEST: CPT | Performed by: EMERGENCY MEDICINE

## 2019-06-12 PROCEDURE — 81003 URINALYSIS AUTO W/O SCOPE: CPT

## 2019-06-12 PROCEDURE — 99284 EMERGENCY DEPT VISIT MOD MDM: CPT

## 2019-06-12 PROCEDURE — 99284 EMERGENCY DEPT VISIT MOD MDM: CPT | Performed by: EMERGENCY MEDICINE

## 2019-06-12 RX ORDER — ONDANSETRON 2 MG/ML
4 INJECTION INTRAMUSCULAR; INTRAVENOUS ONCE
Status: DISCONTINUED | OUTPATIENT
Start: 2019-06-12 | End: 2019-06-13 | Stop reason: HOSPADM

## 2019-06-13 VITALS
WEIGHT: 124.34 LBS | RESPIRATION RATE: 16 BRPM | DIASTOLIC BLOOD PRESSURE: 68 MMHG | BODY MASS INDEX: 23.11 KG/M2 | OXYGEN SATURATION: 99 % | SYSTOLIC BLOOD PRESSURE: 101 MMHG | TEMPERATURE: 98.4 F | HEART RATE: 76 BPM

## 2019-06-13 LAB
ALBUMIN SERPL BCP-MCNC: 4.2 G/DL (ref 3.5–5)
ALP SERPL-CCNC: 63 U/L (ref 46–116)
ALT SERPL W P-5'-P-CCNC: 49 U/L (ref 12–78)
ANION GAP SERPL CALCULATED.3IONS-SCNC: 10 MMOL/L (ref 4–13)
AST SERPL W P-5'-P-CCNC: 25 U/L (ref 5–45)
BILIRUB SERPL-MCNC: 0.3 MG/DL (ref 0.2–1)
BUN SERPL-MCNC: 15 MG/DL (ref 5–25)
CALCIUM SERPL-MCNC: 9.7 MG/DL (ref 8.3–10.1)
CHLORIDE SERPL-SCNC: 102 MMOL/L (ref 100–108)
CO2 SERPL-SCNC: 27 MMOL/L (ref 21–32)
CREAT SERPL-MCNC: 0.92 MG/DL (ref 0.6–1.3)
GFR SERPL CREATININE-BSD FRML MDRD: 89 ML/MIN/1.73SQ M
GLUCOSE SERPL-MCNC: 87 MG/DL (ref 65–140)
LIPASE SERPL-CCNC: 227 U/L (ref 73–393)
POTASSIUM SERPL-SCNC: 3.8 MMOL/L (ref 3.5–5.3)
PROT SERPL-MCNC: 7.6 G/DL (ref 6.4–8.2)
SODIUM SERPL-SCNC: 139 MMOL/L (ref 136–145)

## 2019-06-13 RX ORDER — METRONIDAZOLE 500 MG/1
500 TABLET ORAL EVERY 8 HOURS SCHEDULED
Qty: 21 TABLET | Refills: 0 | Status: SHIPPED | OUTPATIENT
Start: 2019-06-13 | End: 2019-06-20

## 2019-06-13 RX ORDER — METRONIDAZOLE 500 MG/1
500 TABLET ORAL ONCE
Status: COMPLETED | OUTPATIENT
Start: 2019-06-13 | End: 2019-06-13

## 2019-06-13 RX ORDER — CIPROFLOXACIN 500 MG/1
500 TABLET, FILM COATED ORAL ONCE
Status: COMPLETED | OUTPATIENT
Start: 2019-06-13 | End: 2019-06-13

## 2019-06-13 RX ORDER — CIPROFLOXACIN 500 MG/1
500 TABLET, FILM COATED ORAL 2 TIMES DAILY
Qty: 14 TABLET | Refills: 0 | Status: SHIPPED | OUTPATIENT
Start: 2019-06-13 | End: 2019-06-20

## 2019-06-13 RX ADMIN — METRONIDAZOLE 500 MG: 500 TABLET, FILM COATED ORAL at 02:38

## 2019-06-13 RX ADMIN — IOHEXOL 100 ML: 350 INJECTION, SOLUTION INTRAVENOUS at 00:42

## 2019-06-13 RX ADMIN — CIPROFLOXACIN HYDROCHLORIDE 500 MG: 500 TABLET, FILM COATED ORAL at 02:37

## 2021-04-18 ENCOUNTER — IMMUNIZATIONS (OUTPATIENT)
Dept: FAMILY MEDICINE CLINIC | Facility: HOSPITAL | Age: 26
End: 2021-04-18

## 2021-04-18 DIAGNOSIS — Z23 ENCOUNTER FOR IMMUNIZATION: Primary | ICD-10-CM

## 2021-04-18 PROCEDURE — 0001A SARS-COV-2 / COVID-19 MRNA VACCINE (PFIZER-BIONTECH) 30 MCG: CPT | Performed by: OBSTETRICS & GYNECOLOGY

## 2021-04-18 PROCEDURE — 91300 SARS-COV-2 / COVID-19 MRNA VACCINE (PFIZER-BIONTECH) 30 MCG: CPT | Performed by: OBSTETRICS & GYNECOLOGY

## 2021-05-11 ENCOUNTER — IMMUNIZATIONS (OUTPATIENT)
Dept: FAMILY MEDICINE CLINIC | Facility: HOSPITAL | Age: 26
End: 2021-05-11

## 2021-05-11 DIAGNOSIS — Z23 ENCOUNTER FOR IMMUNIZATION: Primary | ICD-10-CM

## 2021-05-11 PROCEDURE — 91300 SARS-COV-2 / COVID-19 MRNA VACCINE (PFIZER-BIONTECH) 30 MCG: CPT

## 2021-05-11 PROCEDURE — 0002A SARS-COV-2 / COVID-19 MRNA VACCINE (PFIZER-BIONTECH) 30 MCG: CPT

## 2021-12-25 PROCEDURE — 80307 DRUG TEST PRSMV CHEM ANLYZR: CPT | Performed by: EMERGENCY MEDICINE

## 2021-12-25 PROCEDURE — 85025 COMPLETE CBC W/AUTO DIFF WBC: CPT | Performed by: EMERGENCY MEDICINE

## 2021-12-25 PROCEDURE — 84443 ASSAY THYROID STIM HORMONE: CPT | Performed by: EMERGENCY MEDICINE

## 2021-12-25 PROCEDURE — 84439 ASSAY OF FREE THYROXINE: CPT | Performed by: EMERGENCY MEDICINE

## 2021-12-25 PROCEDURE — 80179 DRUG ASSAY SALICYLATE: CPT | Performed by: EMERGENCY MEDICINE

## 2021-12-25 PROCEDURE — 99285 EMERGENCY DEPT VISIT HI MDM: CPT

## 2021-12-25 PROCEDURE — 82077 ASSAY SPEC XCP UR&BREATH IA: CPT | Performed by: EMERGENCY MEDICINE

## 2021-12-25 PROCEDURE — 80143 DRUG ASSAY ACETAMINOPHEN: CPT | Performed by: EMERGENCY MEDICINE

## 2021-12-25 PROCEDURE — 80053 COMPREHEN METABOLIC PANEL: CPT | Performed by: EMERGENCY MEDICINE

## 2021-12-26 ENCOUNTER — HOSPITAL ENCOUNTER (EMERGENCY)
Facility: HOSPITAL | Age: 26
End: 2021-12-27
Attending: EMERGENCY MEDICINE | Admitting: EMERGENCY MEDICINE
Payer: COMMERCIAL

## 2021-12-26 DIAGNOSIS — F41.9 ANXIETY: ICD-10-CM

## 2021-12-26 DIAGNOSIS — R45.851 DEPRESSION WITH SUICIDAL IDEATION: Primary | ICD-10-CM

## 2021-12-26 DIAGNOSIS — F32.A DEPRESSION WITH SUICIDAL IDEATION: Primary | ICD-10-CM

## 2021-12-26 LAB
ALBUMIN SERPL BCP-MCNC: 3.6 G/DL (ref 3.5–5)
ALP SERPL-CCNC: 72 U/L (ref 46–116)
ALT SERPL W P-5'-P-CCNC: 50 U/L (ref 12–78)
AMPHETAMINES SERPL QL SCN: NEGATIVE
ANION GAP SERPL CALCULATED.3IONS-SCNC: 6 MMOL/L (ref 4–13)
APAP SERPL-MCNC: <2 UG/ML (ref 10–20)
AST SERPL W P-5'-P-CCNC: 34 U/L (ref 5–45)
BARBITURATES UR QL: NEGATIVE
BASOPHILS # BLD AUTO: 0.04 THOUSANDS/ΜL (ref 0–0.1)
BASOPHILS NFR BLD AUTO: 1 % (ref 0–1)
BENZODIAZ UR QL: NEGATIVE
BILIRUB SERPL-MCNC: 0.12 MG/DL (ref 0.2–1)
BUN SERPL-MCNC: 13 MG/DL (ref 5–25)
CALCIUM SERPL-MCNC: 9.1 MG/DL (ref 8.3–10.1)
CHLORIDE SERPL-SCNC: 103 MMOL/L (ref 100–108)
CO2 SERPL-SCNC: 30 MMOL/L (ref 21–32)
COCAINE UR QL: NEGATIVE
CREAT SERPL-MCNC: 0.71 MG/DL (ref 0.6–1.3)
EOSINOPHIL # BLD AUTO: 0.12 THOUSAND/ΜL (ref 0–0.61)
EOSINOPHIL NFR BLD AUTO: 2 % (ref 0–6)
ERYTHROCYTE [DISTWIDTH] IN BLOOD BY AUTOMATED COUNT: 12.5 % (ref 11.6–15.1)
ETHANOL EXG-MCNC: 0 MG/DL
ETHANOL SERPL-MCNC: <3 MG/DL (ref 0–3)
EXT PREG TEST URINE: NEGATIVE
EXT. CONTROL ED NAV: NORMAL
FLUAV RNA RESP QL NAA+PROBE: NEGATIVE
FLUBV RNA RESP QL NAA+PROBE: NEGATIVE
GFR SERPL CREATININE-BSD FRML MDRD: 119 ML/MIN/1.73SQ M
GLUCOSE SERPL-MCNC: 91 MG/DL (ref 65–140)
HCT VFR BLD AUTO: 37.4 % (ref 34.8–46.1)
HGB BLD-MCNC: 12.4 G/DL (ref 11.5–15.4)
IMM GRANULOCYTES # BLD AUTO: 0.09 THOUSAND/UL (ref 0–0.2)
IMM GRANULOCYTES NFR BLD AUTO: 1 % (ref 0–2)
LYMPHOCYTES # BLD AUTO: 2.52 THOUSANDS/ΜL (ref 0.6–4.47)
LYMPHOCYTES NFR BLD AUTO: 35 % (ref 14–44)
MCH RBC QN AUTO: 30.8 PG (ref 26.8–34.3)
MCHC RBC AUTO-ENTMCNC: 33.2 G/DL (ref 31.4–37.4)
MCV RBC AUTO: 93 FL (ref 82–98)
METHADONE UR QL: NEGATIVE
MONOCYTES # BLD AUTO: 0.64 THOUSAND/ΜL (ref 0.17–1.22)
MONOCYTES NFR BLD AUTO: 9 % (ref 4–12)
NEUTROPHILS # BLD AUTO: 3.9 THOUSANDS/ΜL (ref 1.85–7.62)
NEUTS SEG NFR BLD AUTO: 52 % (ref 43–75)
NRBC BLD AUTO-RTO: 0 /100 WBCS
OPIATES UR QL SCN: NEGATIVE
OXYCODONE+OXYMORPHONE UR QL SCN: NEGATIVE
PCP UR QL: NEGATIVE
PLATELET # BLD AUTO: 229 THOUSANDS/UL (ref 149–390)
PMV BLD AUTO: 10.5 FL (ref 8.9–12.7)
POTASSIUM SERPL-SCNC: 4.1 MMOL/L (ref 3.5–5.3)
PROT SERPL-MCNC: 6.5 G/DL (ref 6.4–8.2)
RBC # BLD AUTO: 4.02 MILLION/UL (ref 3.81–5.12)
RSV RNA RESP QL NAA+PROBE: NEGATIVE
SALICYLATES SERPL-MCNC: <3 MG/DL (ref 3–20)
SARS-COV-2 RNA RESP QL NAA+PROBE: NEGATIVE
SODIUM SERPL-SCNC: 139 MMOL/L (ref 136–145)
T4 FREE SERPL-MCNC: 0.9 NG/DL (ref 0.76–1.46)
THC UR QL: POSITIVE
TSH SERPL DL<=0.05 MIU/L-ACNC: 6.1 UIU/ML (ref 0.36–3.74)
WBC # BLD AUTO: 7.31 THOUSAND/UL (ref 4.31–10.16)

## 2021-12-26 PROCEDURE — 82075 ASSAY OF BREATH ETHANOL: CPT | Performed by: EMERGENCY MEDICINE

## 2021-12-26 PROCEDURE — 81025 URINE PREGNANCY TEST: CPT | Performed by: EMERGENCY MEDICINE

## 2021-12-26 PROCEDURE — 99285 EMERGENCY DEPT VISIT HI MDM: CPT | Performed by: EMERGENCY MEDICINE

## 2021-12-26 PROCEDURE — 0241U HB NFCT DS VIR RESP RNA 4 TRGT: CPT | Performed by: EMERGENCY MEDICINE

## 2021-12-26 RX ORDER — TRAZODONE HYDROCHLORIDE 100 MG/1
50 TABLET ORAL
COMMUNITY
Start: 2021-09-14

## 2021-12-26 RX ORDER — TRAZODONE HYDROCHLORIDE 50 MG/1
50 TABLET ORAL
Status: DISCONTINUED | OUTPATIENT
Start: 2021-12-26 | End: 2021-12-26

## 2021-12-26 RX ORDER — ONDANSETRON 4 MG/1
4 TABLET, ORALLY DISINTEGRATING ORAL ONCE
Status: COMPLETED | OUTPATIENT
Start: 2021-12-26 | End: 2021-12-26

## 2021-12-26 RX ORDER — CLOBETASOL PROPIONATE 0.46 MG/ML
SOLUTION TOPICAL 2 TIMES DAILY
COMMUNITY
Start: 2021-10-25 | End: 2022-10-25

## 2021-12-26 RX ORDER — ALPRAZOLAM 0.25 MG/1
0.5 TABLET ORAL ONCE
Status: COMPLETED | OUTPATIENT
Start: 2021-12-26 | End: 2021-12-26

## 2021-12-26 RX ORDER — TRAZODONE HYDROCHLORIDE 50 MG/1
200 TABLET ORAL
Status: DISCONTINUED | OUTPATIENT
Start: 2021-12-27 | End: 2021-12-27 | Stop reason: HOSPADM

## 2021-12-26 RX ORDER — ALPRAZOLAM 0.5 MG/1
0.5 TABLET ORAL 3 TIMES DAILY PRN
COMMUNITY

## 2021-12-26 RX ORDER — ALPRAZOLAM 0.25 MG/1
0.5 TABLET ORAL 3 TIMES DAILY PRN
Status: DISCONTINUED | OUTPATIENT
Start: 2021-12-26 | End: 2021-12-27 | Stop reason: HOSPADM

## 2021-12-26 RX ORDER — BUPROPION HYDROCHLORIDE 100 MG/1
100 TABLET, EXTENDED RELEASE ORAL DAILY
COMMUNITY

## 2021-12-26 RX ORDER — ARIPIPRAZOLE 5 MG/1
5 TABLET ORAL DAILY
Status: DISCONTINUED | OUTPATIENT
Start: 2021-12-26 | End: 2021-12-27 | Stop reason: HOSPADM

## 2021-12-26 RX ORDER — ARIPIPRAZOLE 5 MG/1
5 TABLET ORAL DAILY
COMMUNITY

## 2021-12-26 RX ORDER — BUPROPION HYDROCHLORIDE 150 MG/1
150 TABLET ORAL DAILY
Status: DISCONTINUED | OUTPATIENT
Start: 2021-12-26 | End: 2021-12-27 | Stop reason: HOSPADM

## 2021-12-26 RX ORDER — SUMATRIPTAN 100 MG/1
TABLET, FILM COATED ORAL
COMMUNITY
Start: 2021-10-22

## 2021-12-26 RX ORDER — CYCLOBENZAPRINE HCL 10 MG
10 TABLET ORAL 3 TIMES DAILY PRN
COMMUNITY
Start: 2021-10-22

## 2021-12-26 RX ORDER — CLOBETASOL PROPIONATE 0.5 MG/G
CREAM TOPICAL 2 TIMES DAILY
Status: DISCONTINUED | OUTPATIENT
Start: 2021-12-26 | End: 2021-12-26

## 2021-12-26 RX ORDER — ALPRAZOLAM 0.25 MG/1
0.5 TABLET ORAL 4 TIMES DAILY PRN
Status: DISCONTINUED | OUTPATIENT
Start: 2021-12-26 | End: 2021-12-27 | Stop reason: HOSPADM

## 2021-12-26 RX ORDER — TRAZODONE HYDROCHLORIDE 50 MG/1
150 TABLET ORAL ONCE
Status: COMPLETED | OUTPATIENT
Start: 2021-12-26 | End: 2021-12-26

## 2021-12-26 RX ADMIN — ALPRAZOLAM 0.5 MG: 0.25 TABLET ORAL at 03:43

## 2021-12-26 RX ADMIN — ALPRAZOLAM 0.5 MG: 0.25 TABLET ORAL at 11:02

## 2021-12-26 RX ADMIN — DULOXETINE HYDROCHLORIDE 90 MG: 60 CAPSULE, DELAYED RELEASE ORAL at 23:50

## 2021-12-26 RX ADMIN — ARIPIPRAZOLE 5 MG: 5 TABLET ORAL at 09:55

## 2021-12-26 RX ADMIN — BUPROPION HYDROCHLORIDE 150 MG: 150 TABLET, FILM COATED, EXTENDED RELEASE ORAL at 09:55

## 2021-12-26 RX ADMIN — ONDANSETRON 4 MG: 4 TABLET, ORALLY DISINTEGRATING ORAL at 03:43

## 2021-12-26 RX ADMIN — ALPRAZOLAM 0.5 MG: 0.25 TABLET ORAL at 23:50

## 2021-12-26 RX ADMIN — TRAZODONE HYDROCHLORIDE 50 MG: 50 TABLET ORAL at 23:32

## 2021-12-26 RX ADMIN — TRAZODONE HYDROCHLORIDE 150 MG: 50 TABLET ORAL at 23:50

## 2021-12-26 NOTE — ED NOTES
Patient resting with room lights on and tv off  No wants or complaints at this time       Jaymie Pardo  12/26/21 8295

## 2021-12-26 NOTE — ED NOTES
Met with patient to complete Intake and Safety Risk Assessments  Patient self-presented to the ED for suicidal ideation  Patient was medically cleared  On exam patient was anxious and tearful / sobbing  Her eye contact was fleeting and she gave minimal responses  Patient stated she has been feeling depressed and chronically suicidal "for the past decade"  Patient stated she came to the ED due to suicidal thoughts and feeling hopeless and overwhelmed with her life  Patient endorsed current suicidal ideation, with plans although refused to disclose her plans  Also reported she has cut herself before, but denied any recent cutting or any previous attempts  Patient admitted to occasionally hearing voices of people, not commanding, and also sees visions of people, not currently  Patient reported to feeling anxious and gave somatic complaint of nausea; patient medically cleared in ED, and sober  Patient denied any appetite disturbances, admitted to episodic sleep disturbances  Patient denied homicidal ideations  Patient did not disclose any specific perpetuating stressors or triggers other than generalized life stress  She did report to "being on too many meds" and that her anxiety and episodic suicidal thoughts are worse during the night  Patient stated her [facility] psychiatrist started her on Wellbutrin x3 weeks ago, with no relief to Sx  Patient was very anxious and sobbing  Reported to smoking marijuana, daily, last use yesterday  Reported social alcohol use, 2 glasses of wine yesterday  UDS negative and BAT 0 00  Patient thought content is reality based  Patient stated she sees a psychiatrist (monthly) and therapist (weekly) at [facility]  Has never been admitted, she states  Patient lives with her partner and feels safe with them  Denied any access to firearms  Patient stated her family is not supportive  Patient was not able to establish a safety plan with this worker       Patient initially was hesitant to sign 201, but signed after inpatient treatment process, her rights, and 72 hour notice was discussed  FELICIA Batista signed 475    Call to listed partner / emergency contact, Montana Cash to obtain collateral  Call went to voice-mail and a message was left, awaiting call back

## 2021-12-26 NOTE — ED PROVIDER NOTES
History  Chief Complaint   Patient presents with    Psychiatric Evaluation     Patient reports in increasing SI throughout past few weeks  Currently being seen by therapist and taking all psych meds as prescribed - recent increase to medications w/o result  Patient states SI, hopelessness and depression  (+) plan, but does not elaborate  States shes believes she needs her medications to be further adjusted  Patient is a 27year old female with worsening depression, anxiety and SI for past few weeks  States her psych meds are not helping  LMP - 1-2 weeks ago  (+) occasional nausea  No vomiting  No fever  No cough  (+) occasional hallucinations as well  Was last seen in this ED on 6/12/19 for colitis  [facility] checked on this patient and last Rx filled was on 12/2/21 for xanax for 30 day supply  History provided by:  Patient   used: No    Psychiatric Evaluation  Presenting symptoms: hallucinations and suicidal thoughts    Associated symptoms: anxiety        Prior to Admission Medications   Prescriptions Last Dose Informant Patient Reported? Taking? ALPRAZolam (XANAX) 0 5 mg tablet   Yes Yes   Sig: Take 0 5 mg by mouth Three times daily as needed   ARIPiprazole (ABILIFY) 5 mg tablet   Yes Yes   Sig: Take 5 mg by mouth daily   DULOXETINE HCL PO   Yes Yes   Sig: Take 90 mg by mouth daily at bedtime   EPINEPHrine (EPIPEN 2-YURIY) 0 3 mg/0 3 mL SOAJ   Yes No   Sig: Inject 0 3 mL as directed   LORazepam (ATIVAN) 0 5 mg tablet Not Taking at Unknown time  No No   Sig: Take 1-2 tablets (0 5-1 mg total) by mouth 2 (two) times a day as needed for anxiety   Patient not taking: Reported on 12/26/2021    Multiple Vitamin (MULTIVITAMINS PO)   Yes Yes   Sig: Take 1 tablet by mouth daily   SUMAtriptan (IMITREX) 100 mg tablet   Yes Yes   Sig: Take 1 tablet as needed for migraine  May use another tablet in 2 hours if needed  Do not exceed 200 mg in 24 hour period     buPROPion [facility]) 100 mg 12 hr tablet   Yes Yes   Sig: Take 100 mg by mouth in the morning   clobetasol (TEMOVATE) 0 05 % external solution   Yes Yes   Sig: Apply topically 2 (two) times a day   cyclobenzaprine (FLEXERIL) 10 mg tablet   Yes Yes   Sig: Take 10 mg by mouth Three times daily as needed   traZODone (DESYREL) 100 mg tablet   Yes Yes   Sig: Take 200 mg by mouth daily at bedtime      Facility-Administered Medications: None       Past Medical History:   Diagnosis Date    Anxiety     Asthma 11/19/2009    Depression     last assessed 7/24/15    Disease of thyroid gland     History of behavior problem     Narcolepsy     Pneumonia     Reactive airway disease 05/13/2009       Past Surgical History:   Procedure Laterality Date    DENTAL SURGERY      TYMPANOSTOMY TUBE PLACEMENT Bilateral     last assessed 7/24/15       Family History   Problem Relation Age of Onset    Colon cancer Mother     Ovarian cancer Mother     No Known Problems Father     Allergies Family     Diabetes Family     Uterine cancer Family      I have reviewed and agree with the history as documented  E-Cigarette/Vaping     E-Cigarette/Vaping Substances     Social History     Tobacco Use    Smoking status: Never Smoker    Smokeless tobacco: Never Used   Substance Use Topics    Alcohol use: Yes     Comment: Social     Drug use: Yes     Types: Marijuana     Comment: last use was yesterday       Review of Systems   Constitutional: Negative for fever  Respiratory: Negative for cough  Gastrointestinal: Positive for nausea  Negative for vomiting  Psychiatric/Behavioral: Positive for dysphoric mood, hallucinations and suicidal ideas  The patient is nervous/anxious  All other systems reviewed and are negative  Physical Exam  Physical Exam  Vitals and nursing note reviewed  Constitutional:       General: She is in acute distress (moderate)  HENT:      Head: Normocephalic and atraumatic        Mouth/Throat:      Mouth: Mucous membranes are moist    Eyes:      General: No scleral icterus  Cardiovascular:      Rate and Rhythm: Normal rate and regular rhythm  Heart sounds: Normal heart sounds  No murmur heard  Pulmonary:      Effort: Pulmonary effort is normal  No respiratory distress  Breath sounds: Normal breath sounds  Abdominal:      General: Bowel sounds are normal       Palpations: Abdomen is soft  Tenderness: There is no abdominal tenderness  Musculoskeletal:         General: No deformity  Cervical back: Normal range of motion and neck supple  Right lower leg: No edema  Left lower leg: No edema  Skin:     General: Skin is warm and dry  Findings: No erythema or rash  Neurological:      General: No focal deficit present  Mental Status: She is alert and oriented to person, place, and time  Psychiatric:      Comments: Somewhat flat affect as well as anxious            Vital Signs  ED Triage Vitals [12/26/21 0117]   Temperature Pulse Respirations Blood Pressure SpO2   98 °F (36 7 °C) 70 18 111/70 100 %      Temp Source Heart Rate Source Patient Position - Orthostatic VS BP Location FiO2 (%)   Oral Monitor Sitting Right arm --      Pain Score       No Pain           Vitals:    12/26/21 0117   BP: 111/70   Pulse: 70   Patient Position - Orthostatic VS: Sitting         Visual Acuity      ED Medications  Medications   ALPRAZolam (XANAX) tablet 0 5 mg (has no administration in time range)   ARIPiprazole (ABILIFY) tablet 5 mg (has no administration in time range)   buPROPion (WELLBUTRIN XL) 24 hr tablet 150 mg (has no administration in time range)   clobetasol (TEMOVATE) 0 05 % cream (has no administration in time range)   traZODone (DESYREL) tablet 50 mg (has no administration in time range)   DULoxetine (CYMBALTA) delayed release capsule 90 mg (has no administration in time range)   ondansetron (ZOFRAN-ODT) dispersible tablet 4 mg (4 mg Oral Given 12/26/21 3382)   ALPRAZolam Freda Arevalo tablet 0 5 mg (0 5 mg Oral Given 12/26/21 0343)       Diagnostic Studies  Results Reviewed     Procedure Component Value Units Date/Time    Ethanol [[id number]]  (Normal) Collected: 12/25/21    Lab Status: Final result Specimen: Blood Updated: 12/26/21 0348     Ethanol Lvl <3 mg/dL     TSH, 3rd generation with Free T4 reflex [[id number]]  (Abnormal) Collected: 12/25/21    Lab Status: Final result Specimen: Blood Updated: 12/26/21 0341     TSH 3RD GENERATON 6 097 uIU/mL     Narrative:      Patients undergoing fluorescein dye angiography may retain small amounts of fluorescein in the body for 48-72 hours post procedure  Samples containing fluorescein can produce falsely depressed TSH values  If the patient had this procedure,a specimen should be resubmitted post fluorescein clearance  Salicylate level [[id number]]  (Abnormal) Collected: 12/25/21    Lab Status: Final result Specimen: Blood Updated: 98/17/37 5446     Salicylate Lvl <3 mg/dL     Acetaminophen level-If concentration is detectable, please discuss with medical  on call  [[id number]]  (Abnormal) Collected: 12/25/21    Lab Status: Final result Specimen: Blood Updated: 12/26/21 0341     Acetaminophen Level <2 ug/mL     T4, free [[id number]] Collected: 12/25/21    Lab Status:  In process Specimen: Blood Updated: 12/26/21 0341    Comprehensive metabolic panel [[id number]]  (Abnormal) Collected: 12/25/21    Lab Status: Final result Specimen: Blood Updated: 12/26/21 0331     Sodium 139 mmol/L      Potassium 4 1 mmol/L      Chloride 103 mmol/L      CO2 30 mmol/L      ANION GAP 6 mmol/L      BUN 13 mg/dL      Creatinine 0 71 mg/dL      Glucose 91 mg/dL      Calcium 9 1 mg/dL      AST 34 U/L      ALT 50 U/L      Alkaline Phosphatase 72 U/L      Total Protein 6 5 g/dL      Albumin 3 6 g/dL      Total Bilirubin 0 12 mg/dL      eGFR 119 ml/min/1 73sq m     Narrative:      [place] guidelines for Chronic Kidney Disease (CKD):    Stage 1 with normal or high GFR (GFR > 90 mL/min/1 73 square meters)    Stage 2 Mild CKD (GFR = 60-89 mL/min/1 73 square meters)    Stage 3A Moderate CKD (GFR = 45-59 mL/min/1 73 square meters)    Stage 3B Moderate CKD (GFR = 30-44 mL/min/1 73 square meters)    Stage 4 Severe CKD (GFR = 15-29 mL/min/1 73 square meters)    Stage 5 End Stage CKD (GFR <15 mL/min/1 73 square meters)  Note: GFR calculation is accurate only with a steady state creatinine    CBC and differential [[id number]] Collected: 12/25/21    Lab Status: Final result Specimen: Blood Updated: 12/26/21 0330     WBC 7 31 Thousand/uL      RBC 4 02 Million/uL      Hemoglobin 12 4 g/dL      Hematocrit 37 4 %      MCV 93 fL      MCH 30 8 pg      MCHC 33 2 g/dL      RDW 12 5 %      MPV 10 5 fL      Platelets 149 Thousands/uL      nRBC 0 /100 WBCs      Neutrophils Relative 52 %      Immat GRANS % 1 %      Lymphocytes Relative 35 %      Monocytes Relative 9 %      Eosinophils Relative 2 %      Basophils Relative 1 %      Neutrophils Absolute 3 90 Thousands/µL      Immature Grans Absolute 0 09 Thousand/uL      Lymphocytes Absolute 2 52 Thousands/µL      Monocytes Absolute 0 64 Thousand/µL      Eosinophils Absolute 0 12 Thousand/µL      Basophils Absolute 0 04 Thousands/µL     POCT pregnancy, urine [[id number]]  (Normal) Resulted: 12/26/21 0311    Lab Status: Final result Updated: 12/26/21 0311     EXT PREG TEST UR (Ref: Negative) negative     Control valid    Rapid drug screen, urine [[id number]]  (Abnormal) Collected: 12/25/21    Lab Status: Final result Specimen: Urine Updated: 12/26/21 0232     Amph/Meth UR Negative     Barbiturate Ur Negative     Benzodiazepine Urine Negative     Cocaine Urine Negative     Methadone Urine Negative     Opiate Urine Negative     PCP Ur Negative     THC Urine Positive     Oxycodone Urine Negative    Narrative:      Presumptive report  If requested, specimen will be sent to reference lab for confirmation    FOR MEDICAL PURPOSES ONLY    IF CONFIRMATION NEEDED PLEASE CONTACT THE LAB WITHIN 5 DAYS  Drug Screen Cutoff Levels:  AMPHETAMINE/METHAMPHETAMINES  1000 ng/mL  BARBITURATES     200 ng/mL  BENZODIAZEPINES     200 ng/mL  COCAINE      300 ng/mL  METHADONE      300 ng/mL  OPIATES      300 ng/mL  PHENCYCLIDINE     25 ng/mL  THC       50 ng/mL  OXYCODONE      100 ng/mL    COVID/FLU/RSV [[id number]]  (Normal) Collected: 12/26/21 0117    Lab Status: Final result Specimen: Nares from Nasopharyngeal Swab Updated: 12/26/21 0215     SARS-CoV-2 Negative     INFLUENZA A PCR Negative     INFLUENZA B PCR Negative     RSV PCR Negative    Narrative:      FOR PEDIATRIC PATIENTS - copy/paste COVID Guidelines URL to browser: [email or web address]  [employer]     This test has been authorized by FDA under an EUA (Emergency Use Assay) for use by authorized laboratories  Clinical caution and judgement should be used with the interpretation of these results with consideration of the clinical impression and other laboratory testing  Testing reported as "Positive" or "Negative" has been proven to be accurate according to standard laboratory validation requirements  All testing is performed with control materials showing appropriate reactivity at standard intervals  POCT alcohol breath test [[id number]]  (Normal) Resulted: 12/26/21 0117    Lab Status: Final result Updated: 12/26/21 0117     EXTBreath Alcohol 0 000                 No orders to display              Procedures  Procedures         ED Course  ED Course as of 12/26/21 0810   Sun Dec 26, 2021   0242 SARS-COV-2: Negative  D/w patient  0242 Rapid drug screen, urine(!)  D/w patient  1941 Patient is medically acceptable for crisis evaluation/dispositioning  Patient sleeping  2862 Signed out to Dr Kate Silva this AM that patient is pending crisis evaluation/dispositioning  [facility] d/w patient                                  SBIRT 20yo+      Most Recent Value   SBIRT (22 yo +)    In order to provide better care to our patients, we are screening all of our patients for alcohol and drug use  Would it be okay to ask you these screening questions? Unable to answer at this time Filed at: 12/26/2021 0117                    MDM  Number of Diagnoses or Management Options  Diagnosis management comments: DDx including but not limited to: depression, anxiety, PTSD, bipolar disorder, suicidal ideation, schizophrenia, schizoaffective disorder, personality disorder, substance abuse, metabolic abnormality, thyroid disease  Amount and/or Complexity of Data Reviewed  Clinical lab tests: ordered and reviewed  Decide to obtain previous medical records or to obtain history from someone other than the patient: yes  Review and summarize past medical records: yes  Discuss the patient with other providers: yes  Independent visualization of images, tracings, or specimens: yes        Disposition  Final diagnoses:   Depression with suicidal ideation   Anxiety     Time reflects when diagnosis was documented in both MDM as applicable and the Disposition within this note     Time User Action Codes Description Comment    12/26/2021  7:55 AM Raisa ADAME,  [id number]] Depression with suicidal ideation     12/26/2021  7:55 AM Raisa Nieto [F41 [id number]] Anxiety       ED Disposition     None      Follow-up Information    None         Patient's Medications   Discharge Prescriptions    No medications on file       No discharge procedures on file      PDMP Review       Value Time User    PDMP Reviewed  Yes 12/26/2021  2:45 AM Allan Chambers MD          ED Provider  Electronically Signed by           Allan Chambers MD  12/26/21 6282

## 2021-12-26 NOTE — ED NOTES
Patient is resting comfortably with lights on  Patient closed door due to patient in next room yelling    Patient can still be monitored via our safety camera     Dora Stephen  12/26/21 2371

## 2021-12-26 NOTE — ED NOTES
Pt partner is currently visiting with her in the room  Patient and partner are crying and hugging each other  Partner is offering support

## 2021-12-26 NOTE — ED NOTES
This RN, crisis, sitter at bedside  Crisis explaining 201 vs 302 to patient, absence of safety plan  Pt against IP treatment at this time, however when reiterated the absence of safety plan and pt not divulging MD BALAJI will pursue 302  Pt began audibally crying, signed 201 abruptly and threw pen and papers on floor        Monica Sanchez RN  12/26/21 6120

## 2021-12-27 VITALS
WEIGHT: 130 LBS | BODY MASS INDEX: 23.92 KG/M2 | HEART RATE: 76 BPM | RESPIRATION RATE: 16 BRPM | HEIGHT: 62 IN | TEMPERATURE: 98.1 F | DIASTOLIC BLOOD PRESSURE: 57 MMHG | OXYGEN SATURATION: 98 % | SYSTOLIC BLOOD PRESSURE: 101 MMHG

## 2021-12-27 RX ADMIN — BUPROPION HYDROCHLORIDE 150 MG: 150 TABLET, FILM COATED, EXTENDED RELEASE ORAL at 09:50

## 2021-12-27 RX ADMIN — ALPRAZOLAM 0.5 MG: 0.25 TABLET ORAL at 09:55

## 2021-12-27 RX ADMIN — ARIPIPRAZOLE 5 MG: 5 TABLET ORAL at 09:50

## 2021-12-27 NOTE — EMTALA/ACUTE CARE TRANSFER
[address] [place] [address]  Dept: [phone]      EMTALA TRANSFER CONSENT    NAME Meño Campbell                                         1995                              MRN [id number]    I have been informed of my rights regarding examination, treatment, and transfer   by Dr Baldev Bloom DO    Benefits: Specialized equipment and/or services available at the receiving facility (Include comment)________________________    Risks: Potential for delay in receiving treatment,Potential deterioration of medical condition,Increased discomfort during transfer,Possible worsening of condition or death during transfer      Consent for Transfer:  I acknowledge that my medical condition has been evaluated and explained to me by the emergency department physician or other qualified medical person and/or my attending physician, who has recommended that I be transferred to the service of  Accepting Physician: Dr Omkar Encinas at [address] [place] [address] Name, [address] : [place], [place]  The above potential benefits of such transfer, the potential risks associated with such transfer, and the probable risks of not being transferred have been explained to me, and I fully understand them  The doctor has explained that, in my case, the benefits of transfer outweigh the risks  I agree to be transferred  I authorize the performance of emergency medical procedures and treatments upon me in both transit and upon arrival at the receiving facility  Additionally, I authorize the release of any and all medical records to the receiving facility and request they be transported with me, if possible  I understand that the safest mode of transportation during a medical emergency is an ambulance and that the Hospital advocates the use of this mode of transport   Risks of traveling to the receiving facility by car, including absence of medical control, life sustaining equipment, such as oxygen, and medical personnel has been explained to me and I fully understand them  (ARASELI CORRECT BOX BELOW)  [  ]  I consent to the stated transfer and to be transported by ambulance/helicopter  [  ]  I consent to the stated transfer, but refuse transportation by ambulance and accept full responsibility for my transportation by car  I understand the risks of non-ambulance transfers and I exonerate the Hospital and its staff from any deterioration in my condition that results from this refusal     X___________________________________________    DATE  21  TIME________  Signature of patient or legally responsible individual signing on patient behalf           RELATIONSHIP TO PATIENT_________________________          Provider Certification    NAME Barak Cochran                                         1995                              MRN [id number]    A medical screening exam was performed on the above named patient  Based on the examination:    Condition Necessitating Transfer The primary encounter diagnosis was Depression with suicidal ideation  A diagnosis of Anxiety was also pertinent to this visit      Patient Condition: The patient has been stabilized such that within reasonable medical probability, no material deterioration of the patient condition or the condition of the unborn child(roslyn) is likely to result from the transfer    Reason for Transfer: Level of Care needed not available at this facility    [place] [address] [place]   · Space available and qualified personnel available for treatment as acknowledged by LAZARO  · Agreed to accept transfer and to provide appropriate medical treatment as acknowledged by       Dr Shiela Read  · Appropriate medical records of the examination and treatment of the patient are provided at the time of transfer   [address] [facility] [address] COMPLETED _______  · Transfer will be performed by qualified personnel from [facility]  and appropriate transfer equipment as required, including the use of necessary and appropriate life support measures  Provider Certification: I have examined the patient and explained the following risks and benefits of being transferred/refusing transfer to the patient/family:  General risk, such as traffic hazards, adverse weather conditions, rough terrain or turbulence, possible failure of equipment (including vehicle or aircraft), or consequences of actions of persons outside the control of the transport personnel,The patient is stable for psychiatric transfer because they are medically stable, and is protected from harming him/herself or others during transport      Based on these reasonable risks and benefits to the patient and/or the unborn child(roslyn), and based upon the information available at the time of the patients examination, I certify that the medical benefits reasonably to be expected from the provision of appropriate medical treatments at another medical facility outweigh the increasing risks, if any, to the individuals medical condition, and in the case of labor to the unborn child, from effecting the transfer      X____________________________________________ DATE 12/27/21        TIME_______      ORIGINAL - SEND TO MEDICAL RECORDS   COPY - SEND WITH PATIENT DURING TRANSFER

## 2021-12-27 NOTE — ED NOTES
Pt is asleep at this time with no signs of distress  Will continue to monitor       Kinza Vergara  12/27/21 5061

## 2021-12-27 NOTE — ED NOTES
Pt is asleep in her bed with both the TV and lights off  No signs of distress at this time   Will continue with pt observation     [facility]  12/27/21 4982

## 2021-12-27 NOTE — ED NOTES
Patient sleeping and in no distress at this time  Will continue to monitor       Devante Mill  12/26/21 3720

## 2021-12-27 NOTE — ED NOTES
Patient calm and cooperative at this time and in no distress         Alma Delia Reynoso  12/26/21 2105

## 2021-12-27 NOTE — ED NOTES
Notified Johnny Infante in Admissions at [facility] that a bed is no longer needed for patient

## 2021-12-27 NOTE — ED NOTES
Pt observation taken over at this time  Pt is on the phone crying telling the person she is speaking to that she is afraid that she is going to be trapped here and that she doesn't know why she's here  Will continue to monitor       Kinza Vergara  12/27/21 0001

## 2021-12-27 NOTE — ED NOTES
Pt is asleep in her bed with both the TV and lights off  No signs of distress at this time   Will continue with pt observation         Shayy Bell  12/27/21 9431

## 2021-12-27 NOTE — ED NOTES
Pt is asleep with the lights on and tv off  No signs of distress  Will continue to monitor       Kinza Vergara  12/27/21 010

## 2021-12-27 NOTE — ED NOTES
DOUG psych consult placed  And DOUG contacted about the consult         Morteza Wiley  12/26/21 9355

## 2021-12-27 NOTE — ED NOTES
Jesus Santiago, from [facility], called to update that they do have a female bed for review, chart faxed        Updated both [facility] with [address]  number for use overnight

## 2021-12-27 NOTE — ED NOTES
Spoke with Stef Gagnon at [facility]; Pt is tentatively accepted pending a copy of 201 is received  Crisis worker sent a request for 12 via YingYang to ED Charge RN requesting same  Crisis to follow up with admissions

## 2021-12-27 NOTE — ED NOTES
Patient;s girlfriend left [facility] and took home all patients belongings per her request      Morteza Patch  12/26/21 1948

## 2021-12-27 NOTE — ED NOTES
Pt is asleep in her bed with both the TV and lights off  No signs of distress at this time   Will continue with pt observation     Chelsyaldair Samaniego  12/27/21 0600

## 2021-12-27 NOTE — ED NOTES
Patient is accepted at [facility]  Patient is accepted by Dr Tom Lopes per Nasima Cifuentes in admissions  Transportation is arranged with TBD  Transportation is scheduled for TBD  Patient may go to the floor at [facility] or later  Nurse report is not needed prior to patient transfer

## 2021-12-27 NOTE — ED NOTES
Bed Search     [place]- Per Nuria, no beds  [facility]- Per Brook George, no beds  [place]- Per Val Moreno, no beds  [facility]- Per Giovanna Davidson, behind on reviewing, but can fax for possible bed availability, chart faxed at this time   [place]- Per Mauricio Cordova, no beds   [facility], no beds   [place]- Per JAMES, no beds   [place]- unable to reach admissions  [facility] - Per Brandy, no beds      Currently there are no beds in network

## 2021-12-27 NOTE — ED NOTES
Pt is asleep in her bed with both the TV and lights off  No signs of distress at this time   Will continue with pt observation         Godfrey Maria  12/27/21 1587

## 2021-12-27 NOTE — ED NOTES
Patient calling again, isn't able to stay by her phone because she is at work. Please leave message.   Provided patient with some snacks per request     Merly Arizmendi RN  12/27/21 6265

## 2021-12-27 NOTE — ED NOTES
Pt is still asleep with no signs of distress  Will continue to monitor       Kinza Vergara  12/27/21 3705

## 2021-12-27 NOTE — ED NOTES
Took over pt observation at this time  Pt is asleep in her bed with both the TV and lights off  No signs of distress at this time  Will continue with pt observation         Ghislaine Bruno  12/27/21 8628

## 2021-12-27 NOTE — ED NOTES
Spoke with Nani at [facility] regarding transport  SLETS to follow up with ED crisis once transport is arranged  [address] admissions to be updated on ETA

## 2022-03-21 ENCOUNTER — HOSPITAL ENCOUNTER (EMERGENCY)
Facility: HOSPITAL | Age: 27
Discharge: HOME | End: 2022-03-21
Attending: EMERGENCY MEDICINE | Admitting: EMERGENCY MEDICINE
Payer: COMMERCIAL

## 2022-03-21 VITALS
OXYGEN SATURATION: 100 % | SYSTOLIC BLOOD PRESSURE: 132 MMHG | TEMPERATURE: 98.3 F | HEART RATE: 85 BPM | DIASTOLIC BLOOD PRESSURE: 85 MMHG | RESPIRATION RATE: 20 BRPM

## 2022-03-21 DIAGNOSIS — L05.01 PILONIDAL ABSCESS: Primary | ICD-10-CM

## 2022-03-21 PROCEDURE — 99282 EMERGENCY DEPT VISIT SF MDM: CPT

## 2022-03-21 PROCEDURE — 10080 I&D PILONIDAL CYST SIMPLE: CPT | Performed by: EMERGENCY MEDICINE

## 2022-03-21 PROCEDURE — 99283 EMERGENCY DEPT VISIT LOW MDM: CPT | Performed by: EMERGENCY MEDICINE

## 2022-03-21 RX ORDER — BUPIVACAINE HYDROCHLORIDE 5 MG/ML
30 INJECTION, SOLUTION EPIDURAL; INTRACAUDAL ONCE
Status: COMPLETED | OUTPATIENT
Start: 2022-03-21 | End: 2022-03-21

## 2022-03-21 RX ORDER — OXYCODONE HYDROCHLORIDE 5 MG/1
5 CAPSULE ORAL EVERY 6 HOURS PRN
Qty: 4 CAPSULE | Refills: 0 | Status: SHIPPED | OUTPATIENT
Start: 2022-03-21

## 2022-03-21 RX ORDER — ALPRAZOLAM 0.25 MG/1
1 TABLET ORAL ONCE
Status: COMPLETED | OUTPATIENT
Start: 2022-03-21 | End: 2022-03-21

## 2022-03-21 RX ADMIN — ALPRAZOLAM 1 MG: 0.25 TABLET ORAL at 17:08

## 2022-03-21 RX ADMIN — BUPIVACAINE HYDROCHLORIDE 30 ML: 5 INJECTION, SOLUTION EPIDURAL; INTRACAUDAL at 17:55

## 2022-03-21 NOTE — ED NOTES
I&D preformed at bedside by Dr Cynthia Mcmanus with Fito Sandhu RN as witness  DSD applied to incision       Wiliam Santiago RN  03/21/22 1646

## 2022-03-21 NOTE — Clinical Note
Jesica Pastrana was seen and treated in our emergency department on 3/21/2022  Diagnosis:     Brandy Quinones  may return to work on return date  She may return on this date: 03/23/2022         If you have any questions or concerns, please don't hesitate to call        Jaylin Baer MD    ______________________________           _______________          _______________  Hospital Representative                              Date                                Time

## 2022-03-21 NOTE — ED PROVIDER NOTES
History  Chief Complaint   Patient presents with    Abscess     PT presents with a pilonidal cycst diagnosis from urgent care and sent here to get drained     27 YR FEMALE C/O APPROX 10 DAYS OF PAIN SWELLING IN TOP OF BUTTOCK CREASE-- NO FEVERS/CHILLS/ SYSTEMIC COMPS-  NO HX OF PREVIOUS --       History provided by:  Friend and patient  Abscess  Location:  Pelvis  Pelvic abscess location:  Gluteal cleft      Prior to Admission Medications   Prescriptions Last Dose Informant Patient Reported? Taking? ALPRAZolam (XANAX) 0 5 mg tablet   Yes No   Sig: Take 0 5 mg by mouth Three times daily as needed   ARIPiprazole (ABILIFY) 5 mg tablet   Yes No   Sig: Take 5 mg by mouth daily   DULOXETINE HCL PO   Yes No   Sig: Take 90 mg by mouth daily at bedtime   EPINEPHrine (EPIPEN 2-YURIY) 0 3 mg/0 3 mL SOAJ   Yes No   Sig: Inject 0 3 mL as directed   LORazepam (ATIVAN) 0 5 mg tablet   No No   Sig: Take 1-2 tablets (0 5-1 mg total) by mouth 2 (two) times a day as needed for anxiety   Patient not taking: Reported on 12/26/2021    Multiple Vitamin (MULTIVITAMINS PO)   Yes No   Sig: Take 1 tablet by mouth daily   SUMAtriptan (IMITREX) 100 mg tablet   Yes No   Sig: Take 1 tablet as needed for migraine  May use another tablet in 2 hours if needed  Do not exceed 200 mg in 24 hour period     buPROPion (WELLBUTRIN SR) 100 mg 12 hr tablet   Yes No   Sig: Take 100 mg by mouth in the morning   clobetasol (TEMOVATE) 0 05 % external solution   Yes No   Sig: Apply topically 2 (two) times a day   cyclobenzaprine (FLEXERIL) 10 mg tablet   Yes No   Sig: Take 10 mg by mouth Three times daily as needed   traZODone (DESYREL) 100 mg tablet   Yes No   Sig: Take 200 mg by mouth daily at bedtime      Facility-Administered Medications: None       Past Medical History:   Diagnosis Date    Anxiety     Asthma 11/19/2009    Depression     last assessed 7/24/15    Disease of thyroid gland     History of behavior problem     Narcolepsy     Pneumonia  Reactive airway disease 05/13/2009       Past Surgical History:   Procedure Laterality Date    DENTAL SURGERY      TYMPANOSTOMY TUBE PLACEMENT Bilateral     last assessed 7/24/15       Family History   Problem Relation Age of Onset    Colon cancer Mother     Ovarian cancer Mother     No Known Problems Father     Allergies Family     Diabetes Family     Uterine cancer Family      I have reviewed and agree with the history as documented  E-Cigarette/Vaping    E-Cigarette Use Never User      E-Cigarette/Vaping Substances     Social History     Tobacco Use    Smoking status: Current Every Day Smoker     Packs/day: 0 50     Types: Cigarettes    Smokeless tobacco: Never Used   Vaping Use    Vaping Use: Never used   Substance Use Topics    Alcohol use: Yes     Comment: Social     Drug use: Yes     Types: Marijuana       Review of Systems   Constitutional: Negative  HENT: Negative  Eyes: Negative  Respiratory: Negative  Cardiovascular: Negative  Gastrointestinal: Negative  Endocrine: Negative  Genitourinary: Negative  Musculoskeletal: Negative  Skin: Negative  BUTTOCK CREASE SWELLING    Allergic/Immunologic: Negative  Neurological: Negative  Hematological: Negative  Psychiatric/Behavioral: Negative for agitation, behavioral problems, confusion, decreased concentration, dysphoric mood, hallucinations, self-injury, sleep disturbance and suicidal ideas  The patient is nervous/anxious  The patient is not hyperactive  Physical Exam  Physical Exam  Vitals reviewed  Constitutional:       General: She is in acute distress  Appearance: She is not ill-appearing or toxic-appearing  Comments: AVSS-  ALL VS OBTAINED IN TRIAGE-- PT ACTIVELY HYPERVENTILATING / CRYING IN ROOM DO TO HER ANXIETY - TOLDER MD AND NURSE TO LEAVE ROOM BEFORE COULD EVEN INTRODUCE  THEMSELVES TO HER    Skin:     General: Skin is warm  Findings: Erythema present        Comments: At top of buttock crease  r sided almond shaped 2 x3 cm abscess with fluctance- overlying erythema-- no perianal involvement    Neurological:      Mental Status: She is alert     Psychiatric:      Comments: ACUTE PANIC ATTACK/ HYPERVENTILATING          Vital Signs  ED Triage Vitals [03/21/22 1624]   Temperature Pulse Respirations Blood Pressure SpO2   98 3 °F (36 8 °C) 85 20 132/85 100 %      Temp Source Heart Rate Source Patient Position - Orthostatic VS BP Location FiO2 (%)   Oral Monitor Standing Left arm --      Pain Score       --           Vitals:    03/21/22 1624   BP: 132/85   Pulse: 85   Patient Position - Orthostatic VS: Standing         Visual Acuity      ED Medications  Medications   ALPRAZolam (XANAX) tablet 1 mg (1 mg Oral Given 3/21/22 1708)       Diagnostic Studies  Results Reviewed     None                 No orders to display              Procedures  Procedures         ED Course  ED Course as of 03/23/22 1032   Mon Mar 21, 2022   1655 -    1655 ER MD NOTE- ER MD WENT IN TO SEE PT-  DID NOT EVEN  TOUCH PT OR EXAMINE PATIENT-- PATIENT CRYING / HYPERVENTILATING - ASKING FOR ER DOCTOR AND NURSE TO LEAVE  DO TO HER BASELINE ANXIETY- WORSE  GOING TO HOSPITAL-- ER MD ASKED ER NURSE TO  CHECK PATIENT IN ALITTLE WHILE AND WHEN PATIENT IS READY  TO BE EVALUATED--    1704 ER MD NOTE- ER MD WENT IN TO RE-EVALUATE PATIENT- PT MORE COMPOSED-- STATES HER ANXIETY IS BAD AND CURRENTLY HAS NO RESCUE MEDICATION - HAS BEEN ON XANAX IN PAST- ER MD OFFERED PO XANAX--  - PT ACCEPTS-  PT AWARE THAT ER MD WILL GIVE ORAL XANAX ABOUT 30 MINUTES THEN WILL RE-ASSESS PATIENT- ER NURSE AWARE    5632 - ER MD NOTE- PT ASKING  FOR PAIN MEDICATION  CAN NOT TAKE NSAIDS- AND TYLENOL HAS NOT BEEN HELPING HER- AWARE THAT  NEXT STEP WOULD BE OPOIDS-- PT IS OK WITH A BRIEF RX FOR OPOIDS- AND A WORK NOTE FOR TOMORROW                                              MDM    Disposition  Final diagnoses:   None     ED Disposition     None Follow-up Information    None         Patient's Medications   Discharge Prescriptions    No medications on file       No discharge procedures on file      PDMP Review       Value Time User    PDMP Reviewed  Yes 12/26/2021  2:45 Dano King MD          ED Provider  Electronically Signed by           Dolly Amaya MD  03/23/22 7829

## 2022-03-21 NOTE — ED PROCEDURE NOTE
PROCEDURE  Incision and drain    Date/Time: 3/21/2022 6:07 PM  Performed by: Estiven Vincent MD  Authorized by: Estiven Vincent MD   [facility] Protocol:  Procedure performed by:  Consent: Verbal consent obtained  Risks and benefits: risks, benefits and alternatives were discussed  Consent given by: patient  Time out: Immediately prior to procedure a "time out" was called to verify the correct patient, procedure, equipment, support staff and site/side marked as required  Timeout called at: 3/21/2022 6:07 PM   Patient understanding: patient states understanding of the procedure being performed  Required blood products, implants, devices, and special equipment available: NONE  Patient identity confirmed: verbally with patient      Unsuccessful Attempt: unsuccessful attempt    Patient location:  ED  Location:     Type:  Abscess (2 CM )    Size:  2 X 3 CM    Location: PILONIDAL  Pre-procedure details:     Skin preparation:  Antiseptic wash    Skin preparation:  CHLOROHEXIDINE  Anesthesia (see MAR for exact dosages): Anesthesia method:  None (PT UANBLE TO TOLERATED MARCAINE FIELD BLOCK TODL BY PATIENT ADN FRIEND TO JUST MAKE INCISION ADN DRAIN ABSCESS )  Procedure details:     Complexity:  Simple    Incision types:  Single straight    Scalpel blade:  15    Approach:  Open    Incision depth:  Subcutaneous    Techniques: PT UANBLE T O TOELRATE ANY THIGN OTHER THAN INCISION  DO TO ANXIETY     Drainage:  Purulent    Drainage amount:   Moderate    Wound treatment:  Wound left open  Comments:      DRESSING PLACED BY JARRETT ROSE --          Estiven Vincent MD  03/21/22 8959

## 2022-03-21 NOTE — DISCHARGE INSTRUCTIONS
DIAGNOSIS: PILONIDAL ABSCESS - COLLECTION OF PUS IN BUTTOCK CREASE     - ACTIVITY AS TOLERATED -- EXPECT  BLOODY DRAINAGE FOR NEXT 1-2 WEEKS- SHOULD DECREASE OVER TIME - DRESS WOUND DAILY AS NEEDED     - PLEASE WASH  AREA DAILY GENTLY WITH SOAP AND WATER     - APPLY WARM SOAPY RAG TO AREA FOR AT LEAST 30 MINUTES A DAY  FOR NEXT WEEK TO LET AREA DRAIN     - FOR PAIN- OVER THE COUNTER TYLENOL 500 MG EVERY 4 HRS AS NEEDED- PAIN SHOULD BE LESS AS PRESSURE OF ABSCESS HAS BEEN RELIEVED     - HAVE SOMEONE INSPECT AREA DAILY -- PLEASE RETURN TO  THE ER FOR ANY SPREADING REDNESS/WARMTH/SWELLING PAIN/ ANY FEVERS- TEMP > 100 4/ ANY SHAKING CHILLS     - THE WOUND SHOULD HEAL OVER THE NEXT MONTH     -

## 2022-03-21 NOTE — ED NOTES
Pt had severe panic attack and dismissed nurse and doctor out of room       Hiren Mello RN  03/21/22 3051

## 2022-05-07 ENCOUNTER — HOSPITAL ENCOUNTER (EMERGENCY)
Facility: HOSPITAL | Age: 27
Discharge: HOME | End: 2022-05-07
Attending: EMERGENCY MEDICINE | Admitting: EMERGENCY MEDICINE
Payer: COMMERCIAL

## 2022-05-07 VITALS
HEART RATE: 75 BPM | WEIGHT: 150 LBS | RESPIRATION RATE: 18 BRPM | BODY MASS INDEX: 27.6 KG/M2 | HEIGHT: 62 IN | SYSTOLIC BLOOD PRESSURE: 113 MMHG | DIASTOLIC BLOOD PRESSURE: 75 MMHG | TEMPERATURE: 98.4 F | OXYGEN SATURATION: 100 %

## 2022-05-07 DIAGNOSIS — T78.40XA ALLERGIC REACTION, INITIAL ENCOUNTER: Primary | ICD-10-CM

## 2022-05-07 PROCEDURE — 99283 EMERGENCY DEPT VISIT LOW MDM: CPT

## 2022-05-07 PROCEDURE — 99284 EMERGENCY DEPT VISIT MOD MDM: CPT | Performed by: STUDENT IN AN ORGANIZED HEALTH CARE EDUCATION/TRAINING PROGRAM

## 2022-05-07 RX ORDER — FAMOTIDINE 20 MG/1
20 TABLET, FILM COATED ORAL ONCE
Status: COMPLETED | OUTPATIENT
Start: 2022-05-07 | End: 2022-05-07

## 2022-05-07 RX ORDER — ONDANSETRON 4 MG/1
4 TABLET, ORALLY DISINTEGRATING ORAL ONCE
Status: COMPLETED | OUTPATIENT
Start: 2022-05-07 | End: 2022-05-07

## 2022-05-07 RX ORDER — FAMOTIDINE 20 MG/1
20 TABLET, FILM COATED ORAL 2 TIMES DAILY
Qty: 14 TABLET | Refills: 0 | Status: SHIPPED | OUTPATIENT
Start: 2022-05-08 | End: 2022-05-15

## 2022-05-07 RX ORDER — PREDNISONE 50 MG/1
50 TABLET ORAL DAILY
Qty: 4 TABLET | Refills: 0 | Status: SHIPPED | OUTPATIENT
Start: 2022-05-08 | End: 2022-05-12

## 2022-05-07 RX ORDER — PREDNISONE 20 MG/1
60 TABLET ORAL ONCE
Status: COMPLETED | OUTPATIENT
Start: 2022-05-07 | End: 2022-05-07

## 2022-05-07 RX ORDER — DIPHENHYDRAMINE HCL 25 MG
50 TABLET ORAL ONCE
Status: COMPLETED | OUTPATIENT
Start: 2022-05-07 | End: 2022-05-07

## 2022-05-07 RX ADMIN — DIPHENHYDRAMINE HYDROCHLORIDE 50 MG: 25 TABLET ORAL at 22:42

## 2022-05-07 RX ADMIN — ONDANSETRON 4 MG: 4 TABLET, ORALLY DISINTEGRATING ORAL at 23:31

## 2022-05-07 RX ADMIN — FAMOTIDINE 20 MG: 20 TABLET ORAL at 22:42

## 2022-05-07 RX ADMIN — PREDNISONE 60 MG: 20 TABLET ORAL at 22:43

## 2022-05-08 NOTE — ED PROVIDER NOTES
History  Chief Complaint   Patient presents with    Allergic Reaction     patient reports hives starting on b/l lower leg Tuesday and now reports difficulty speaking, nausea and headache  Patient able to swallow and is not drooling      PMHx: anxiety, depression, asthma, migraines, reactive airway disease  No pertinent Cody Ruffin is a 24year old female who presents to the ED with hives to b/l LE and hands that began 5 days, states has been taking benadryl with mild improvement, last took at [facility]  Patient states was prescribed an epi pen 5 years ago, did not take today  Patient also notes intermittent difficulty speaking, described as difficulty moving her jaw to speak, nausea (denies current), and mild gradual onset HA since onset of rash  Patient states works at [employer], notes exposure to various dog, but states this is typical for her and denies h/o similar episodes  Allergy to bee sting and naproxen notes, denies exposure to diet  Allergy to various foods noted; however, patient states has been able to tolerate such foods with no adverse reactions for many years  Patient denies throat closing sensation, dysphagia, drooling, facial/lip/tongue swelling, SOB, stridor, word finding difficulty, numbness/tingling, weakness, CP, abdominal pain, v/d, urinary symptoms, or any other concerns at this time  Patient denies change in medication, diet, lotion, detergent, body wash; denies exposure to plants  Significant other present at bedside  History provided by:  Patient and medical records   used: No    Allergic Reaction  Presenting symptoms: itching and rash    Presenting symptoms: no difficulty breathing, no difficulty swallowing, no swelling and no wheezing    Relieved by: Antihistamines      Prior to Admission Medications   Prescriptions Last Dose Informant Patient Reported? Taking?    ALPRAZolam (XANAX) 0 5 mg tablet Not Taking at Unknown time  Yes No   Sig: Take 0 5 mg by mouth Three times daily as needed   Patient not taking: Reported on 5/7/2022    ARIPiprazole (ABILIFY) 5 mg tablet Not Taking at Unknown time  Yes No   Sig: Take 5 mg by mouth daily   Patient not taking: Reported on 5/7/2022    DULOXETINE HCL PO Not Taking at Unknown time  Yes No   Sig: Take 90 mg by mouth daily at bedtime   Patient not taking: Reported on 5/7/2022    EPINEPHrine (EPIPEN 2-YURIY) 0 3 mg/0 3 mL SOAJ   Yes Yes   Sig: Inject 0 3 mL as directed   LORazepam (ATIVAN) 0 5 mg tablet Not Taking at Unknown time  No No   Sig: Take 1-2 tablets (0 5-1 mg total) by mouth 2 (two) times a day as needed for anxiety   Patient not taking: Reported on 12/26/2021    Multiple Vitamin (MULTIVITAMINS PO) 5/7/2022 at Unknown time  Yes Yes   Sig: Take 1 tablet by mouth daily   SUMAtriptan (IMITREX) 100 mg tablet Not Taking at Unknown time  Yes No   Sig: Take 1 tablet as needed for migraine  May use another tablet in 2 hours if needed  Do not exceed 200 mg in 24 hour period     Patient not taking: Reported on 5/7/2022   buPROPion (WELLBUTRIN SR) 100 mg 12 hr tablet Not Taking at Unknown time  Yes No   Sig: Take 100 mg by mouth in the morning   Patient not taking: Reported on 5/7/2022    clobetasol (TEMOVATE) 0 05 % external solution Not Taking at Unknown time  Yes No   Sig: Apply topically 2 (two) times a day   Patient not taking: Reported on 5/7/2022    cyclobenzaprine (FLEXERIL) 10 mg tablet Not Taking at Unknown time  Yes No   Sig: Take 10 mg by mouth Three times daily as needed   Patient not taking: Reported on 5/7/2022    oxyCODONE (OXY-IR) 5 MG capsule Not Taking at Unknown time  No No   Sig: Take 1 capsule (5 mg total) by mouth every 6 (six) hours as needed for severe pain for up to 4 doses Max Daily Amount: 20 mg   Patient not taking: Reported on 5/7/2022    traZODone (DESYREL) 100 mg tablet   Yes Yes   Sig: Take 50 mg by mouth daily at bedtime        Facility-Administered Medications: None       Past Medical History:   Diagnosis Date  Anxiety     Asthma 11/19/2009    Depression     last assessed 7/24/15    Disease of thyroid gland     History of behavior problem     Narcolepsy     Pneumonia     Reactive airway disease 05/13/2009       Past Surgical History:   Procedure Laterality Date    DENTAL SURGERY      TYMPANOSTOMY TUBE PLACEMENT Bilateral     last assessed 7/24/15       Family History   Problem Relation Age of Onset    Colon cancer Mother     Ovarian cancer Mother     No Known Problems Father     Allergies Family     Diabetes Family     Uterine cancer Family      I have reviewed and agree with the history as documented  E-Cigarette/Vaping    E-Cigarette Use Never User      E-Cigarette/Vaping Substances     Social History     Tobacco Use    Smoking status: Current Every Day Smoker     Packs/day: 0 25     Types: Cigarettes    Smokeless tobacco: Never Used   Vaping Use    Vaping Use: Never used   Substance Use Topics    Alcohol use: Not Currently     Comment: Social     Drug use: Yes     Types: Marijuana     Comment: last used 05/07/22       Review of Systems   Constitutional: Negative for chills and fever  HENT: Negative for ear pain, sore throat and trouble swallowing  Eyes: Negative for pain and visual disturbance  Respiratory: Negative for cough, shortness of breath and wheezing  Cardiovascular: Negative for chest pain and palpitations  Gastrointestinal: Positive for nausea  Negative for abdominal pain, diarrhea and vomiting  Genitourinary: Negative for dysuria and frequency  Musculoskeletal: Negative for arthralgias and back pain  Skin: Positive for itching and rash  Negative for color change  Neurological: Positive for speech difficulty (see HPI) and headaches  Negative for dizziness, syncope, facial asymmetry, weakness, light-headedness and numbness  Physical Exam  Physical Exam  Vitals and nursing note reviewed  Constitutional:       General: She is not in acute distress  Appearance: Normal appearance  She is well-developed  She is not ill-appearing  Comments: Well appearing, speaking in full sentences, resting comfortably on stretcher, VSS, tolerating secretions   HENT:      Head: Normocephalic and atraumatic  Comments: No lip swelling     Right Ear: External ear normal       Left Ear: External ear normal       Nose: Nose normal  No congestion or rhinorrhea  Mouth/Throat:      Mouth: Mucous membranes are moist       Pharynx: Oropharynx is clear  Uvula midline  No pharyngeal swelling, oropharyngeal exudate, posterior oropharyngeal erythema or uvula swelling  Tonsils: No tonsillar exudate or tonsillar abscesses  Comments: No oropharyngeal or tongue swelling  Eyes:      General:         Right eye: No discharge  Left eye: No discharge  Extraocular Movements: Extraocular movements intact  Conjunctiva/sclera: Conjunctivae normal       Pupils: Pupils are equal, round, and reactive to light  Cardiovascular:      Rate and Rhythm: Normal rate and regular rhythm  Heart sounds: No murmur heard  No friction rub  No gallop  Pulmonary:      Effort: Pulmonary effort is normal  No respiratory distress  Breath sounds: Normal breath sounds  No stridor  No wheezing, rhonchi or rales  Abdominal:      General: Abdomen is flat  Bowel sounds are normal  There is no distension  Palpations: Abdomen is soft  Tenderness: There is no abdominal tenderness  There is no guarding or rebound  Genitourinary:     Comments: Deferred  Musculoskeletal:         General: No swelling, deformity or signs of injury  Cervical back: Normal range of motion and neck supple  Right lower leg: No edema  Left lower leg: No edema  Skin:     General: Skin is warm and dry  Capillary Refill: Capillary refill takes less than 2 seconds  Findings: Rash present  No bruising or erythema   Rash is urticarial       Comments: Urticaria to b/l LE, no lesions present to hands  No wounds  Negative nikolsky sign  No evidence of cellulitis or abscess  Neurological:      General: No focal deficit present  Mental Status: She is alert and oriented to person, place, and time  Cranial Nerves: No cranial nerve deficit  Sensory: No sensory deficit  Motor: No weakness (5/5 strength to b/l UE & LE)  Coordination: Coordination normal       Gait: Gait normal    Psychiatric:         Mood and Affect: Mood normal          Vital Signs  ED Triage Vitals [05/07/22 2209]   Temperature Pulse Respirations Blood Pressure SpO2   98 4 °F (36 9 °C) 75 18 113/75 100 %      Temp Source Heart Rate Source Patient Position - Orthostatic VS BP Location FiO2 (%)   Oral Monitor Sitting Left arm --      Pain Score       --           Vitals:    05/07/22 2209   BP: 113/75   Pulse: 75   Patient Position - Orthostatic VS: Sitting         Visual Acuity      ED Medications  Medications   diphenhydrAMINE (BENADRYL) tablet 50 mg (50 mg Oral Given 5/7/22 2242)   predniSONE tablet 60 mg (60 mg Oral Given 5/7/22 2243)   famotidine (PEPCID) tablet 20 mg (20 mg Oral Given 5/7/22 2242)   ondansetron (ZOFRAN-ODT) dispersible tablet 4 mg (4 mg Oral Given 5/7/22 2331)       Diagnostic Studies  Results Reviewed     None                 No orders to display              Procedures  Procedures         ED Course  ED Course as of 05/09/22 0610   Sat May 07, 2022   2326 Patient states difficulty speaking resolved, will give zofran for nausea  Patient observed in ED, VSS throughout ED course, well appearing, stable for discharge  SBIRT 20yo+      Most Recent Value   SBIRT (24 yo +)    In order to provide better care to our patients, we are screening all of our patients for alcohol and drug use  Would it be okay to ask you these screening questions?  Unable to answer at this time Filed at: 05/07/2022 2217                    MDM  Number of Diagnoses or Management Options  Allergic reaction, initial encounter  Diagnosis management comments: Patient is a 24year old female who presents to the ED with urticaria to b/l LE and hands, intermittent difficulty moving jaw, nausea, and mild gradual onset HA x 5 days  No known trigger for symptoms  Patient denies word finding difficulty, no slurred speech, normal neuro exam, denies thunderclap HA, head CT not warranted as low suspicion for ICH and CVA/TIA  No evidence of SJS/TEN, cellulitis, or abscess  Prednisone, benadryl, and pepcid given with resolution of difficulty moving jaw  Nausea resolved with zofran  Patient well appearing, VSS, stable for discharge  Significant other to drive patient home from ED  -prednisone x 4 days  -benadryl QHS PRN, sedative effects discussed, advised not to operate any heavy machinery including a motor vehicle while taking  -Claritin or zytrec PRN  -pepcid x 7 days  -motrin/tylenol PRN  -f/u with PCP  -f/u with allergist PRN  -strict ED return precautions discussed     Patient verbalized understanding and agreement with the management plan  Strict ED return instructions were discussed at bedside and all questions were answered  Prior to discharge, I provided both verbal and written instructions of the management plan and the signs and symptoms that should prompt the patient to return to the ED  All questions were answered and the patient was comfortable with the plan of care and discharged home  The patient agrees to return to the Emergency Department for concerns and/or progression of illness  Patient Progress  Patient progress: improved      Disposition  Final diagnoses:    Allergic reaction, initial encounter     Time reflects when diagnosis was documented in both MDM as applicable and the Disposition within this note     Time User Action Codes Description Comment    5/7/2022 11:28 PM Octavio Mendoza Add [[id number] [id number]] Allergic reaction, initial encounter       ED Disposition     ED Disposition Condition Date/Time Comment    Discharge Stable Sat May 7, 2022 11:28 PM [facility] [facility] [address] discharge to home/self care              Follow-up Information     Follow up With Specialties Details Why Contact Info Additional Information    Kyle Harrington DO Family Medicine Schedule an appointment as soon as possible for a visit in 3 days  [phone]       [facility] Schedule an appointment as soon as possible for a visit  As needed [address]  [address] [place] [facility] [address], [address] [facility] [address], [place], [place], [address], [address] [facility] Emergency Department Emergency Medicine  If symptoms worsen [address] [id number]  [address] [facility] Emergency Department, [address]          Discharge Medication List as of 5/7/2022 11:32 PM      START taking these medications    Details   famotidine (PEPCID) 20 mg tablet Take 1 tablet (20 mg total) by mouth 2 (two) times a day for 7 days, Starting Sun 5/8/2022, Until Sun 5/15/2022, Normal      predniSONE 50 mg tablet Take 1 tablet (50 mg total) by mouth daily for 4 days, Starting Sun 5/8/2022, Until u 5/12/2022, Normal         CONTINUE these medications which have NOT CHANGED    Details   EPINEPHrine (EPIPEN 2-YURIY) 0 3 mg/0 3 mL SOAJ Inject 0 3 mL as directed, Starting Wed 8/16/2017, Historical Med      Multiple Vitamin (MULTIVITAMINS PO) Take 1 tablet by mouth daily, Historical Med      traZODone (DESYREL) 100 mg tablet Take 50 mg by mouth daily at bedtime  , Starting Tue 9/14/2021, Historical Med      ALPRAZolam (XANAX) 0 5 mg tablet Take 0 5 mg by mouth Three times daily as needed, Historical Med      ARIPiprazole (ABILIFY) 5 mg tablet Take 5 mg by mouth daily, Historical Med      buPROPion (WELLBUTRIN SR) 100 mg 12 hr tablet Take 100 mg by mouth in the morning, Historical Med      clobetasol (TEMOVATE) 0 05 % external solution Apply topically 2 (two) times a day, Starting Mon 10/25/2021, Until Tue 10/25/2022, Historical Med      cyclobenzaprine (FLEXERIL) 10 mg tablet Take 10 mg by mouth Three times daily as needed, Starting Fri 10/22/2021, Historical Med      DULOXETINE HCL PO Take 90 mg by mouth daily at bedtime, Historical Med      LORazepam (ATIVAN) 0 5 mg tablet Take 1-2 tablets (0 5-1 mg total) by mouth 2 (two) times a day as needed for anxiety, Starting Wed 1/30/2019, Print      oxyCODONE (OXY-IR) 5 MG capsule Take 1 capsule (5 mg total) by mouth every 6 (six) hours as needed for severe pain for up to 4 doses Max Daily Amount: 20 mg, Starting Mon 3/21/2022, Normal      SUMAtriptan (IMITREX) 100 mg tablet Take 1 tablet as needed for migraine  May use another tablet in 2 hours if needed  Do not exceed 200 mg in 24 hour period  , Historical Med             No discharge procedures on file      PDMP Review       Value Time User    PDMP Reviewed  Yes 12/26/2021  2:45 Ryan Gleason MD          ED Provider  Electronically Signed by           Layla Squires PA-C  05/09/22 0233

## 2022-05-08 NOTE — DISCHARGE INSTRUCTIONS
Please take all 4 days of prednisone and all 7 days of pepcid  Can take benadryl as needed for itching, will cause drowsiness so do not operate any heavy machinery including a motor vehicle while take  Can take claritin or zyrtec as needed for itching and will not cause drowsiness  Please follow up with your PCP to ensure resolution of symptoms  Please return to the ED with any new or worsening symptoms

## 2022-05-08 NOTE — ED NOTES
D/c instructions reviewed, pt verbalized understanding and has no further questions at this time  Pt ambulatory off unit with steady gait       Juany Gleason, MAGALI  05/07/22 5520

## 2022-07-02 NOTE — ED NOTES
PT VAC check      Amount in canister in mL:350  State of seal: intact   Modifications made: none needed, per RN, able to change canister when needed   Date of next dressing change:7/4/22   Pt is tearful, unable to sit for blood work at this time d/t her anxiety and fear of needles  Provider aware, will recheck back to get bloodwork once klonopin administered helps her anxiety       Ruiz Hernandez RN  10/16/18 2335